# Patient Record
Sex: MALE | Race: BLACK OR AFRICAN AMERICAN | NOT HISPANIC OR LATINO | Employment: FULL TIME | URBAN - METROPOLITAN AREA
[De-identification: names, ages, dates, MRNs, and addresses within clinical notes are randomized per-mention and may not be internally consistent; named-entity substitution may affect disease eponyms.]

---

## 2020-10-31 ENCOUNTER — HOSPITAL ENCOUNTER (EMERGENCY)
Facility: HOSPITAL | Age: 40
Discharge: HOME | End: 2020-10-31
Attending: INTERNAL MEDICINE
Payer: COMMERCIAL

## 2020-10-31 VITALS
TEMPERATURE: 100 F | HEIGHT: 70 IN | DIASTOLIC BLOOD PRESSURE: 66 MMHG | BODY MASS INDEX: 25.77 KG/M2 | WEIGHT: 180 LBS | OXYGEN SATURATION: 96 % | RESPIRATION RATE: 18 BRPM | SYSTOLIC BLOOD PRESSURE: 117 MMHG | HEART RATE: 74 BPM

## 2020-10-31 DIAGNOSIS — R50.9 FEVER, UNSPECIFIED FEVER CAUSE: ICD-10-CM

## 2020-10-31 DIAGNOSIS — R05.9 COUGH: ICD-10-CM

## 2020-10-31 DIAGNOSIS — B34.9 VIRAL SYNDROME: ICD-10-CM

## 2020-10-31 DIAGNOSIS — J06.9 VIRAL URI WITH COUGH: ICD-10-CM

## 2020-10-31 DIAGNOSIS — J34.89 RHINORRHEA: Primary | ICD-10-CM

## 2020-10-31 LAB
CTP QC/QA: YES
INFLUENZA A ANTIGEN, POC: NEGATIVE
INFLUENZA B ANTIGEN, POC: NEGATIVE
SARS-COV-2 RDRP RESP QL NAA+PROBE: NEGATIVE

## 2020-10-31 PROCEDURE — 99284 EMERGENCY DEPT VISIT MOD MDM: CPT | Mod: 25,ER

## 2020-10-31 PROCEDURE — U0002 COVID-19 LAB TEST NON-CDC: HCPCS | Mod: ER | Performed by: PHYSICIAN ASSISTANT

## 2020-10-31 PROCEDURE — 25000003 PHARM REV CODE 250: Mod: ER | Performed by: EMERGENCY MEDICINE

## 2020-10-31 PROCEDURE — 87502 INFLUENZA DNA AMP PROBE: CPT | Mod: ER

## 2020-10-31 RX ORDER — ALBUTEROL SULFATE 90 UG/1
1-2 AEROSOL, METERED RESPIRATORY (INHALATION) EVERY 6 HOURS PRN
Qty: 8 G | Refills: 0 | Status: ON HOLD | OUTPATIENT
Start: 2020-10-31 | End: 2022-04-01 | Stop reason: HOSPADM

## 2020-10-31 RX ORDER — CODEINE PHOSPHATE AND GUAIFENESIN 10; 100 MG/5ML; MG/5ML
5 SOLUTION ORAL EVERY 8 HOURS PRN
Qty: 118 ML | Refills: 0 | Status: SHIPPED | OUTPATIENT
Start: 2020-10-31 | End: 2020-11-10

## 2020-10-31 RX ORDER — ACETAMINOPHEN 325 MG/1
650 TABLET ORAL
Status: COMPLETED | OUTPATIENT
Start: 2020-10-31 | End: 2020-10-31

## 2020-10-31 RX ORDER — BENZONATATE 100 MG/1
100 CAPSULE ORAL 3 TIMES DAILY PRN
Qty: 20 CAPSULE | Refills: 0 | Status: SHIPPED | OUTPATIENT
Start: 2020-10-31 | End: 2020-11-10

## 2020-10-31 RX ORDER — FLUTICASONE PROPIONATE 50 MCG
1 SPRAY, SUSPENSION (ML) NASAL 2 TIMES DAILY PRN
Qty: 15 G | Refills: 0 | Status: ON HOLD | OUTPATIENT
Start: 2020-10-31 | End: 2022-04-01 | Stop reason: HOSPADM

## 2020-10-31 RX ORDER — CETIRIZINE HYDROCHLORIDE 10 MG/1
10 TABLET ORAL DAILY
Qty: 30 TABLET | Refills: 0 | Status: ON HOLD | OUTPATIENT
Start: 2020-10-31 | End: 2022-04-01 | Stop reason: HOSPADM

## 2020-10-31 RX ADMIN — ACETAMINOPHEN 650 MG: 325 TABLET ORAL at 06:10

## 2020-10-31 NOTE — Clinical Note
"Lenard"Carlton Marrufo was seen and treated in our emergency department on 10/31/2020.     COVID-19 is present in our communities across the state. There is limited testing for COVID at this time, so not all patients can be tested. In this situation, your employee meets the following criteria:    Lenard Marrufo has met the criteria for COVID-19 testing and has a NEGATIVE result. The employee can return to work once they are asymptomatic for 72 hours without the use of fever reducing medications (Tylenol, Motrin, etc).     If you have any questions or concerns, or if I can be of further assistance, please do not hesitate to contact me.    Sincerely,             Claudio Reynaga PA-C"

## 2020-10-31 NOTE — ED PROVIDER NOTES
Encounter Date: 10/31/2020    SCRIBE #1 NOTE: I, Kathi Miranda, am scribing for, and in the presence of,  JAIME Dutta. I have scribed the following portions of the note - Other sections scribed: HPI, ROS, PE.       History     Chief Complaint   Patient presents with    COVID-19 Concerns     PRODUCTIVE COUGH, SOB, RUNNY NOSE, CONGESTION AND FEVER; DENIES N/V/D     Lenard Marrufo is a 40 y.o. male with a history of HIV who presents to the ED complaining of productive cough, SOB, rhinorrhea, congestion, fever, chills, and myalgias onset four days ago. States that it started with rhinorrhea four days ago, but started coughing three days ago. He took Tylenol, with some relief. Denies nausea, vomiting, or diarrhea. Patient does not take medication daily. Denies past surgical history. PCP is Dr. Agnes Glover.    The history is provided by the patient. No  was used.     Review of patient's allergies indicates:  No Known Allergies  Past Medical History:   Diagnosis Date    HIV (human immunodeficiency virus infection)      History reviewed. No pertinent surgical history.  No family history on file.  Social History     Tobacco Use    Smoking status: Current Every Day Smoker     Types: Cigars   Substance Use Topics    Alcohol use: Yes    Drug use: Never     Review of Systems   Constitutional: Positive for chills and fever.   HENT: Positive for congestion and rhinorrhea. Negative for sore throat.    Respiratory: Positive for cough (productive) and shortness of breath.    Cardiovascular: Negative for leg swelling.   Gastrointestinal: Negative for diarrhea, nausea and vomiting.   Musculoskeletal: Positive for myalgias.   Skin: Negative for rash.   Neurological: Negative for numbness.       Physical Exam     Initial Vitals [10/31/20 1808]   BP Pulse Resp Temp SpO2   133/85 97 18 (!) 101.3 °F (38.5 °C) 97 %      MAP       --         Physical Exam    Nursing note and vitals reviewed.  Constitutional:  He appears well-developed and well-nourished. No distress.   HENT:   Head: Normocephalic and atraumatic.   Nose: Rhinorrhea present.   Eyes: Conjunctivae and EOM are normal. Pupils are equal, round, and reactive to light.   Neck: Normal range of motion. Neck supple.   Cardiovascular: Normal rate, regular rhythm, normal heart sounds and intact distal pulses.   Pulmonary/Chest: Effort normal and breath sounds normal. No respiratory distress.   Abdominal: Soft. Normal appearance and bowel sounds are normal. There is no abdominal tenderness. There is no rigidity, no rebound, no guarding, no CVA tenderness and negative Major's sign.   Musculoskeletal: Normal range of motion.   Neurological: He is alert and oriented to person, place, and time.   Skin: Skin is warm, dry and intact. No rash noted.   Psychiatric: He has a normal mood and affect. His speech is normal and behavior is normal. Judgment and thought content normal. Cognition and memory are normal.         ED Course   Procedures  Labs Reviewed   SARS-COV-2 RDRP GENE   POCT RAPID INFLUENZA A/B          Imaging Results          X-Ray Chest AP Portable (Final result)  Result time 10/31/20 19:20:53    Final result by Seamus Reyes MD (10/31/20 19:20:53)                 Impression:      1. No acute cardiopulmonary process, hypoventilatory exam.      Electronically signed by: Seamus Reyes MD  Date:    10/31/2020  Time:    19:20             Narrative:    EXAMINATION:  XR CHEST AP PORTABLE    CLINICAL HISTORY:  Cough    TECHNIQUE:  Single frontal view of the chest was performed.    COMPARISON:  None    FINDINGS:  The cardiomediastinal silhouette is not enlarged.  There is no pleural effusion.  The trachea is midline.  The lungs are symmetrically expanded bilaterally without evidence of acute parenchymal process. No large focal consolidation seen.  There is no pneumothorax.  The osseous structures are unremarkable.                                 Medical Decision  Making:   History:   Old Medical Records: I decided to obtain old medical records.  Independently Interpreted Test(s):   I have ordered and independently interpreted X-rays - see prior notes.  Clinical Tests:   Lab Tests: Reviewed and Ordered  Radiological Study: Reviewed and Ordered  ED Management:  Hemodynamically stable.  Nontoxic and in no acute distress.  Patient is overall well-appearing, pleasant, conversational.  COVID and flu negative.  Chest x-ray without acute process.  Patient given Tylenol for fever.  History physical exam findings consistent with viral URI.  Will discharge home with prescriptions for symptomatic relief and PCP follow-up.  Strict ED return precautions given for any worsening or additional concerning symptoms.  Patient verbalizes understanding and is agreeable with plan.            Scribe Attestation:   Scribe #1: I performed the above scribed service and the documentation accurately describes the services I performed. I attest to the accuracy of the note.    Scribe attestation: I, Claudio Reynaga, personally performed the services described in this documentation.  All medical record entries made by the scribe were at my direction and in my presence.  I have reviewed the chart and agree that the record reflects my personal performance and is accurate and complete.                  Clinical Impression:     ICD-10-CM ICD-9-CM   1. Rhinorrhea  J34.89 478.19   2. Cough  R05 786.2   3. Fever, unspecified fever cause  R50.9 780.60   4. Viral syndrome  B34.9 079.99   5. Viral URI with cough  J06.9 465.9                      Disposition:   Disposition: Discharged  Condition: Stable     ED Disposition Condition    Discharge Stable        ED Prescriptions     Medication Sig Dispense Start Date End Date Auth. Provider    albuterol (PROVENTIL/VENTOLIN HFA) 90 mcg/actuation inhaler Inhale 1-2 puffs into the lungs every 6 (six) hours as needed. Rescue 8 g 10/31/2020 10/31/2021 Claudio Reynaga PA-C     fluticasone propionate (FLONASE) 50 mcg/actuation nasal spray 1 spray (50 mcg total) by Each Nostril route 2 (two) times daily as needed. 15 g 10/31/2020  Claudio Reynaga PA-C    cetirizine (ZYRTEC) 10 MG tablet Take 1 tablet (10 mg total) by mouth once daily. 30 tablet 10/31/2020 10/31/2021 Claudio Reynaga PA-C    guaifenesin-codeine 100-10 mg/5 ml (TUSSI-ORGANIDIN NR)  mg/5 mL syrup Take 5 mLs by mouth every 8 (eight) hours as needed for Cough. 118 mL 10/31/2020 11/10/2020 Claudio Reynaga PA-C    benzonatate (TESSALON) 100 MG capsule Take 1 capsule (100 mg total) by mouth 3 (three) times daily as needed. 20 capsule 10/31/2020 11/10/2020 Claudio Reynaga PA-C        Follow-up Information     Follow up With Specialties Details Why Contact Info    [facility]  Schedule an appointment as soon as possible for a visit   [address] [place] [address]  [phone]      [facility] Emergency Department Emergency Medicine Go to  If symptoms worsen [address] [facility] [id number] [phone]                                       Claudio Reynaga PA-C  11/04/20 0525

## 2020-11-01 NOTE — DISCHARGE INSTRUCTIONS
Please take new medication as directed and follow discharge instructions provided.  Please make sure to follow-up with your PCP to discuss today's emergency department visit and for further evaluation and management.  Please return emergency department immediately if your symptoms worsen or you develop any additional concerning symptoms.

## 2022-03-21 ENCOUNTER — HOSPITAL ENCOUNTER (EMERGENCY)
Facility: HOSPITAL | Age: 42
Discharge: HOME | End: 2022-03-21
Attending: EMERGENCY MEDICINE
Payer: COMMERCIAL

## 2022-03-21 VITALS
WEIGHT: 180 LBS | HEART RATE: 53 BPM | TEMPERATURE: 98 F | HEIGHT: 70 IN | BODY MASS INDEX: 25.77 KG/M2 | DIASTOLIC BLOOD PRESSURE: 62 MMHG | SYSTOLIC BLOOD PRESSURE: 117 MMHG | OXYGEN SATURATION: 97 % | RESPIRATION RATE: 20 BRPM

## 2022-03-21 DIAGNOSIS — R11.2 NAUSEA AND VOMITING, INTRACTABILITY OF VOMITING NOT SPECIFIED, UNSPECIFIED VOMITING TYPE: Primary | ICD-10-CM

## 2022-03-21 DIAGNOSIS — R10.13 EPIGASTRIC ABDOMINAL PAIN: ICD-10-CM

## 2022-03-21 LAB
ALBUMIN SERPL-MCNC: 3.5 G/DL (ref 3.3–5.5)
ALBUMIN SERPL-MCNC: 4.3 G/DL (ref 3.3–5.5)
ALLENS TEST: ABNORMAL
ALP SERPL-CCNC: 53 U/L (ref 42–141)
ALP SERPL-CCNC: 57 U/L (ref 42–141)
BILIRUB SERPL-MCNC: 0.5 MG/DL (ref 0.2–1.6)
BILIRUB SERPL-MCNC: 0.5 MG/DL (ref 0.2–1.6)
BILIRUBIN, POC UA: NEGATIVE
BLOOD, POC UA: NEGATIVE
BUN SERPL-MCNC: 13 MG/DL (ref 7–22)
CALCIUM SERPL-MCNC: 9.7 MG/DL (ref 8–10.3)
CHLORIDE SERPL-SCNC: 110 MMOL/L (ref 98–108)
CLARITY, POC UA: CLEAR
COLOR, POC UA: YELLOW
CREAT SERPL-MCNC: 1.1 MG/DL (ref 0.6–1.2)
GLUCOSE SERPL-MCNC: 117 MG/DL (ref 73–118)
GLUCOSE, POC UA: NEGATIVE
HCO3 UR-SCNC: 32.2 MMOL/L (ref 24–28)
KETONES, POC UA: NEGATIVE
LDH SERPL L TO P-CCNC: 1.58 MMOL/L (ref 0.5–2.2)
LEUKOCYTE EST, POC UA: NEGATIVE
NITRITE, POC UA: NEGATIVE
PCO2 BLDA: 49.9 MMHG (ref 35–45)
PH SMN: 7.42 [PH] (ref 7.35–7.45)
PH UR STRIP: 7 [PH]
PO2 BLDA: 29 MMHG (ref 40–60)
POC ALT (SGPT): 14 U/L (ref 10–47)
POC ALT (SGPT): 15 U/L (ref 10–47)
POC AMYLASE: 64 U/L (ref 14–97)
POC AST (SGOT): 27 U/L (ref 11–38)
POC AST (SGOT): 38 U/L (ref 11–38)
POC BE: 6 MMOL/L
POC GGT: 16 U/L (ref 5–65)
POC SATURATED O2: 55 % (ref 95–100)
POC TCO2: 28 MMOL/L (ref 18–33)
POC TCO2: 34 MMOL/L (ref 24–29)
POTASSIUM BLD-SCNC: 4.9 MMOL/L (ref 3.6–5.1)
PROTEIN, POC UA: NEGATIVE
PROTEIN, POC: 7.7 G/DL (ref 6.4–8.1)
PROTEIN, POC: 8.5 G/DL (ref 6.4–8.1)
SAMPLE: ABNORMAL
SITE: ABNORMAL
SODIUM BLD-SCNC: 143 MMOL/L (ref 128–145)
SPECIFIC GRAVITY, POC UA: 1.01
UROBILINOGEN, POC UA: 1 E.U./DL

## 2022-03-21 PROCEDURE — 85025 COMPLETE CBC W/AUTO DIFF WBC: CPT | Mod: ER

## 2022-03-21 PROCEDURE — 96375 TX/PRO/DX INJ NEW DRUG ADDON: CPT | Mod: ER

## 2022-03-21 PROCEDURE — 25500020 PHARM REV CODE 255: Mod: ER | Performed by: EMERGENCY MEDICINE

## 2022-03-21 PROCEDURE — 63600175 PHARM REV CODE 636 W HCPCS: Mod: ER | Performed by: EMERGENCY MEDICINE

## 2022-03-21 PROCEDURE — 82150 ASSAY OF AMYLASE: CPT | Mod: ER

## 2022-03-21 PROCEDURE — 96361 HYDRATE IV INFUSION ADD-ON: CPT | Mod: ER

## 2022-03-21 PROCEDURE — 96374 THER/PROPH/DIAG INJ IV PUSH: CPT | Mod: ER

## 2022-03-21 PROCEDURE — 81003 URINALYSIS AUTO W/O SCOPE: CPT | Mod: ER

## 2022-03-21 PROCEDURE — 25000003 PHARM REV CODE 250: Mod: ER | Performed by: EMERGENCY MEDICINE

## 2022-03-21 PROCEDURE — 80053 COMPREHEN METABOLIC PANEL: CPT | Mod: ER

## 2022-03-21 PROCEDURE — 96372 THER/PROPH/DIAG INJ SC/IM: CPT | Mod: 59 | Performed by: EMERGENCY MEDICINE

## 2022-03-21 PROCEDURE — 99285 EMERGENCY DEPT VISIT HI MDM: CPT | Mod: 25,ER

## 2022-03-21 RX ORDER — ONDANSETRON 2 MG/ML
8 INJECTION INTRAMUSCULAR; INTRAVENOUS
Status: COMPLETED | OUTPATIENT
Start: 2022-03-21 | End: 2022-03-21

## 2022-03-21 RX ORDER — KETOROLAC TROMETHAMINE 30 MG/ML
15 INJECTION, SOLUTION INTRAMUSCULAR; INTRAVENOUS
Status: COMPLETED | OUTPATIENT
Start: 2022-03-21 | End: 2022-03-21

## 2022-03-21 RX ORDER — ONDANSETRON 4 MG/1
4 TABLET, ORALLY DISINTEGRATING ORAL EVERY 8 HOURS PRN
Qty: 20 TABLET | Refills: 0 | Status: SHIPPED | OUTPATIENT
Start: 2022-03-21 | End: 2022-04-20 | Stop reason: ALTCHOICE

## 2022-03-21 RX ORDER — DICYCLOMINE HYDROCHLORIDE 20 MG/1
20 TABLET ORAL 2 TIMES DAILY
Qty: 20 TABLET | Refills: 0 | Status: SHIPPED | OUTPATIENT
Start: 2022-03-21 | End: 2022-04-20 | Stop reason: ALTCHOICE

## 2022-03-21 RX ORDER — KETOROLAC TROMETHAMINE 10 MG/1
10 TABLET, FILM COATED ORAL EVERY 6 HOURS
Qty: 20 TABLET | Refills: 0 | Status: SHIPPED | OUTPATIENT
Start: 2022-03-21 | End: 2022-03-26

## 2022-03-21 RX ORDER — DICYCLOMINE HYDROCHLORIDE 10 MG/ML
20 INJECTION INTRAMUSCULAR
Status: COMPLETED | OUTPATIENT
Start: 2022-03-21 | End: 2022-03-21

## 2022-03-21 RX ADMIN — IOHEXOL 100 ML: 350 INJECTION, SOLUTION INTRAVENOUS at 06:03

## 2022-03-21 RX ADMIN — KETOROLAC TROMETHAMINE 15 MG: 30 INJECTION, SOLUTION INTRAMUSCULAR; INTRAVENOUS at 06:03

## 2022-03-21 RX ADMIN — SODIUM CHLORIDE 1000 ML: 0.9 INJECTION, SOLUTION INTRAVENOUS at 04:03

## 2022-03-21 RX ADMIN — ONDANSETRON 8 MG: 2 INJECTION INTRAMUSCULAR; INTRAVENOUS at 04:03

## 2022-03-21 RX ADMIN — DICYCLOMINE HYDROCHLORIDE 20 MG: 20 INJECTION INTRAMUSCULAR at 04:03

## 2022-03-21 NOTE — ED PROVIDER NOTES
Encounter Date: 3/21/2022       History     Chief Complaint   Patient presents with    Vomiting     Pt c/o vomiting x 1hr     41 y.o. male with HIV on HAART last CD4 count 700's, VL undetectable, tobacco abuse presents emergency department complaining of acute nausea, vomiting and epigastric abdominal pain that began an hour prior to arrival.  He describes abdominal pain as a dull, bloating/fullness sensation.  He reports associated belching and increase flatus.  Reports last bowel movement today was soft, nonbloody, non melenic.  He reports taking Tums with minimal improvement.  Denies prior abdominal surgery.  Denies heavy NSAID use or EtOH use. Reports last meal around 11:00 p.m. last night.         The history is provided by the patient.     Review of patient's allergies indicates:  No Known Allergies  Past Medical History:   Diagnosis Date    HIV (human immunodeficiency virus infection)      History reviewed. No pertinent surgical history.  History reviewed. No pertinent family history.  Social History     Tobacco Use    Smoking status: Current Every Day Smoker     Types: Cigars   Substance Use Topics    Alcohol use: Yes    Drug use: Never     Review of Systems   Constitutional: Negative for appetite change and fever.   Respiratory: Negative for shortness of breath.    Cardiovascular: Negative for chest pain.   Gastrointestinal: Positive for abdominal pain, nausea and vomiting. Negative for constipation and diarrhea.   Genitourinary: Negative for decreased urine volume, difficulty urinating, frequency and hematuria.   All other systems reviewed and are negative.      Physical Exam     Initial Vitals [03/21/22 0411]   BP Pulse Resp Temp SpO2   (!) 173/74 61 20 98 °F (36.7 °C) 98 %      MAP       --         Physical Exam    Nursing note and vitals reviewed.  Constitutional: He appears well-developed and well-nourished. He is not diaphoretic. No distress.   HENT:   Head: Normocephalic and atraumatic.    Mouth/Throat: Oropharynx is clear and moist.   Eyes: Conjunctivae are normal.   Neck: Phonation normal. No stridor present.   Normal range of motion.  Cardiovascular: Regular rhythm and intact distal pulses.   Pulmonary/Chest: Effort normal. No accessory muscle usage or stridor. No tachypnea. No respiratory distress.   Abdominal: He exhibits no distension. There is abdominal tenderness in the epigastric area.   No right CVA tenderness.  No left CVA tenderness. There is no rebound, no guarding and no tenderness at McBurney's point.   Musculoskeletal:         General: No tenderness. Normal range of motion.      Cervical back: Normal range of motion.     Neurological: He is alert and oriented to person, place, and time. He has normal strength. Gait normal. GCS eye subscore is 4. GCS verbal subscore is 5. GCS motor subscore is 6.   Skin: Skin is warm and intact.   Psychiatric: He has a normal mood and affect.         ED Course   Procedures  Labs Reviewed   ISTAT PROCEDURE - Abnormal; Notable for the following components:       Result Value    POC PCO2 49.9 (*)     POC PO2 29 (*)     POC HCO3 32.2 (*)     POC SATURATED O2 55 (*)     POC TCO2 34 (*)     All other components within normal limits   POCT LIVER PANEL - Abnormal; Notable for the following components:    Protein, POC 8.5 (*)     All other components within normal limits   POCT CMP - Abnormal; Notable for the following components:    POC Chloride 110 (*)     All other components within normal limits   POCT CBC   POCT URINALYSIS W/O SCOPE   POCT URINALYSIS W/O SCOPE   POCT CMP   POCT LIVER PANEL          Imaging Results          US Abdomen Limited (Final result)  Result time 03/21/22 07:54:49    Final result by Jean Carlos Fontaine MD (03/21/22 07:54:49)                 Impression:      Gallbladder wall thickening and cholelithiasis without unusual vascularity or positive Major sign or definite acute cholecystitis.      Electronically signed by: Jean Carlos  MD Minal  Date:    03/21/2022  Time:    07:54             Narrative:    EXAMINATION:  US ABDOMEN LIMITED    CLINICAL HISTORY:  epigastric abdominal pain;    TECHNIQUE:  Limited ultrasound of the right upper quadrant of the abdomen (including pancreas, liver, gallbladder, common bile duct, and spleen) was performed.    COMPARISON:  CT scan abdomen pelvis 03/21/2022 558 hours    FINDINGS:  Liver: Normal in size, measuring 16.6 cm. Homogeneous echotexture. No focal hepatic lesions.    Gallbladder: Gallbladder wall 5.1 mm thickness, negative Major sign, cholelithiasis with largest stone 1.3 cm, 1.2 cm stone in gallbladder neck region, no unusual hypervascularity.    Biliary system: The common duct is not dilated, measuring 2.7 mm.  No intrahepatic ductal dilatation.    Spleen: Normal in size and echotexture, measuring 11.1 cm.    Miscellaneous: No upper abdominal ascites.                               CT Abdomen Pelvis With Contrast (Final result)  Result time 03/21/22 06:38:21    Final result by Seng Rodriguez MD (03/21/22 06:38:21)                 Impression:      Nonspecific gallbladder wall thickening and/or pericholecystic fluid.  No calcified gallstones.  Consider further evaluation with right upper quadrant ultrasound if there is clinical concern for cholecystitis.    Subtle rectal wall thickening with mild surrounding edema and trace pelvic free fluid.  Findings could be related to proctitis though consider follow-up endoscopy to further evaluate rectal wall thickening when clinically appropriate to exclude neoplasm.      Electronically signed by: Seng Rodriguez MD  Date:    03/21/2022  Time:    06:38             Narrative:    EXAMINATION:  CT ABDOMEN PELVIS WITH CONTRAST    CLINICAL HISTORY:  Epigastric pain;    TECHNIQUE:  Low dose axial images, sagittal and coronal reformations were obtained from the lung bases to the pubic symphysis following the IV administration of 100 mL of Omnipaque 350 .  Oral  contrast was not given.    COMPARISON:  None.    FINDINGS:  Lower Chest:    Lung bases are clear.  Heart size is normal.    Abdomen:    Liver is normal in size and contour.  No focal hepatic lesion.  No calcified gallstones identified.  There is gallbladder wall thickening and/or pericholecystic fluid.  No intrahepatic biliary ductal dilatation.    Spleen, adrenals, and pancreas are unremarkable.  No peripancreatic inflammatory changes or peripancreatic collection.    The kidneys are symmetric.  No hydronephrosis. No asymmetric perinephric inflammatory changes.    No small bowel obstruction.  Few colonic diverticula without CT findings of acute diverticulitis.  Appendix is normal.  There is rectal wall thickening with subtle perirectal edema.    No pneumoperitoneum or organized fluid collection.    No bulky lymphadenopathy.    Abdominal aorta is normal in caliber without significant atherosclerosis.    Portal, splenic, and superior mesenteric veins are patent.    Pelvis:    Urinary bladder is thin walled.  Prostate is unremarkable.  There is rectal wall thickening and subtle perirectal edema.  Trace pelvic free fluid.  No pelvic lymphadenopathy.    Bones and soft tissues:    No aggressive osseous lesions.  Extraperitoneal soft tissues are negative for acute finding.                                 Medications   sodium chloride 0.9% bolus 1,000 mL (0 mLs Intravenous Stopped 3/21/22 0638)   ondansetron injection 8 mg (8 mg Intravenous Given 3/21/22 0439)   iohexoL (OMNIPAQUE 350) injection 100 mL (100 mLs Intravenous Given 3/21/22 0615)   dicyclomine injection 20 mg (20 mg Intramuscular Given 3/21/22 0457)   ketorolac injection 15 mg (15 mg Intravenous Given 3/21/22 0651)     Medical Decision Making:   ED Management:  Care turned over to Dr. Alegre pending results of RUQ ultrasound and final disposition  Cele Rios MD, Emergency Medicine Staff 7:00 AM 3/21/2022               ED Course as of 03/21/22 1030    Mon Mar 21, 2022   0645 Pain improved 2/10, no emesis thus far [DL]      ED Course User Index  [DL] Cele Rios MD          Assumed care patient from Dr. Rios, pending ultrasound as noted above.  ED lab work reviewed, unremarkable, CT scan concerning for possible cholecystitis/gallbladder wall thickening, as well as rectal wall thickening.  Patient reassessed, abdomen benign.  Ultrasound reviewed showing cholelithiasis, patient is currently asymptomatic.  Did suspect acute cholecystitis/cholangitis/symptomatic cholecystitis.  Will give patient follow-up for Gastroenterology and General surgery, further treatment options discussed, will treat symptomatically at home. Discussed diagnosis and further treatment with patient, including f/u as needed.  Return precautions given and all questions answered.  Patient in understanding of plan.  Pt discharged to home improved and stable.           Labs Reviewed        Admission on 03/21/2022, Discharged on 03/21/2022   Component Date Value Ref Range Status    POC PH 03/21/2022 7.417  7.35 - 7.45 Final    POC PCO2 03/21/2022 49.9 (A) 35 - 45 mmHg Final    POC PO2 03/21/2022 29 (A) 40 - 60 mmHg Final    POC HCO3 03/21/2022 32.2 (A) 24 - 28 mmol/L Final    POC BE 03/21/2022 6  -2 to 2 mmol/L Final    POC SATURATED O2 03/21/2022 55 (A) 95 - 100 % Final    POC Lactate 03/21/2022 1.58  0.5 - 2.2 mmol/L Final    POC TCO2 03/21/2022 34 (A) 24 - 29 mmol/L Final    Sample 03/21/2022 VENOUS   Final    Site 03/21/2022 Other   Final    Allens Test 03/21/2022 N/A   Final    Albumin, POC 03/21/2022 4.3  3.3 - 5.5 g/dL Final    Alkaline Phosphatase, POC 03/21/2022 53  42 - 141 U/L Final    ALT (SGPT), POC 03/21/2022 14  10 - 47 U/L Final    Amylase, POC 03/21/2022 64  14 - 97 U/L Final    AST (SGOT), POC 03/21/2022 38  11 - 38 U/L Final    POC GGT 03/21/2022 16  5 - 65 U/L Final    Bilirubin, POC 03/21/2022 0.5  0.2 - 1.6 mg/dL Final    Protein, POC 03/21/2022 8.5 (A)  6.4 - 8.1 g/dL Final    Albumin, POC 03/21/2022 3.5  3.3 - 5.5 g/dL Final    Alkaline Phosphatase, POC 03/21/2022 57  42 - 141 U/L Final    ALT (SGPT), POC 03/21/2022 15  10 - 47 U/L Final    AST (SGOT), POC 03/21/2022 27  11 - 38 U/L Final    POC BUN 03/21/2022 13  7 - 22 mg/dL Final    Calcium, POC 03/21/2022 9.7  8.0 - 10.3 mg/dL Final    POC Chloride 03/21/2022 110 (A) 98 - 108 mmol/L Final    POC Creatinine 03/21/2022 1.1  0.6 - 1.2 mg/dL Final    POC Glucose 03/21/2022 117  73 - 118 mg/dL Final    POC Potassium 03/21/2022 4.9  3.6 - 5.1 mmol/L Final    POC Sodium 03/21/2022 143  128 - 145 mmol/L Final    Bilirubin, POC 03/21/2022 0.5  0.2 - 1.6 mg/dL Final    POC TCO2 03/21/2022 28  18 - 33 mmol/L Final    Protein, POC 03/21/2022 7.7  6.4 - 8.1 g/dL Final    Glucose, UA 03/21/2022 Negative   Final    Bilirubin, UA 03/21/2022 Negative   Final    Ketones, UA 03/21/2022 Negative   Final    Spec Grav UA 03/21/2022 1.015   Final    Blood, UA 03/21/2022 Negative   Final    PH, UA 03/21/2022 7.0   Final    Protein, UA 03/21/2022 Negative   Final    Urobilinogen, UA 03/21/2022 1.0  E.U./dL Final    Nitrite, UA 03/21/2022 Negative   Final    Leukocytes, UA 03/21/2022 Negative   Final    Color, UA 03/21/2022 Yellow   Final    Clarity, UA 03/21/2022 Clear   Final        Imaging Reviewed    Imaging Results          US Abdomen Limited (Final result)  Result time 03/21/22 07:54:49    Final result by Jean Carlos Fontaine MD (03/21/22 07:54:49)                 Impression:      Gallbladder wall thickening and cholelithiasis without unusual vascularity or positive Major sign or definite acute cholecystitis.      Electronically signed by: Jean Carlos Fontaine MD  Date:    03/21/2022  Time:    07:54             Narrative:    EXAMINATION:  US ABDOMEN LIMITED    CLINICAL HISTORY:  epigastric abdominal pain;    TECHNIQUE:  Limited ultrasound of the right upper quadrant of the abdomen (including pancreas,  liver, gallbladder, common bile duct, and spleen) was performed.    COMPARISON:  CT scan abdomen pelvis 03/21/2022 558 hours    FINDINGS:  Liver: Normal in size, measuring 16.6 cm. Homogeneous echotexture. No focal hepatic lesions.    Gallbladder: Gallbladder wall 5.1 mm thickness, negative Major sign, cholelithiasis with largest stone 1.3 cm, 1.2 cm stone in gallbladder neck region, no unusual hypervascularity.    Biliary system: The common duct is not dilated, measuring 2.7 mm.  No intrahepatic ductal dilatation.    Spleen: Normal in size and echotexture, measuring 11.1 cm.    Miscellaneous: No upper abdominal ascites.                               CT Abdomen Pelvis With Contrast (Final result)  Result time 03/21/22 06:38:21    Final result by Seng Rodriguez MD (03/21/22 06:38:21)                 Impression:      Nonspecific gallbladder wall thickening and/or pericholecystic fluid.  No calcified gallstones.  Consider further evaluation with right upper quadrant ultrasound if there is clinical concern for cholecystitis.    Subtle rectal wall thickening with mild surrounding edema and trace pelvic free fluid.  Findings could be related to proctitis though consider follow-up endoscopy to further evaluate rectal wall thickening when clinically appropriate to exclude neoplasm.      Electronically signed by: Seng Rodriguez MD  Date:    03/21/2022  Time:    06:38             Narrative:    EXAMINATION:  CT ABDOMEN PELVIS WITH CONTRAST    CLINICAL HISTORY:  Epigastric pain;    TECHNIQUE:  Low dose axial images, sagittal and coronal reformations were obtained from the lung bases to the pubic symphysis following the IV administration of 100 mL of Omnipaque 350 .  Oral contrast was not given.    COMPARISON:  None.    FINDINGS:  Lower Chest:    Lung bases are clear.  Heart size is normal.    Abdomen:    Liver is normal in size and contour.  No focal hepatic lesion.  No calcified gallstones identified.  There is  gallbladder wall thickening and/or pericholecystic fluid.  No intrahepatic biliary ductal dilatation.    Spleen, adrenals, and pancreas are unremarkable.  No peripancreatic inflammatory changes or peripancreatic collection.    The kidneys are symmetric.  No hydronephrosis. No asymmetric perinephric inflammatory changes.    No small bowel obstruction.  Few colonic diverticula without CT findings of acute diverticulitis.  Appendix is normal.  There is rectal wall thickening with subtle perirectal edema.    No pneumoperitoneum or organized fluid collection.    No bulky lymphadenopathy.    Abdominal aorta is normal in caliber without significant atherosclerosis.    Portal, splenic, and superior mesenteric veins are patent.    Pelvis:    Urinary bladder is thin walled.  Prostate is unremarkable.  There is rectal wall thickening and subtle perirectal edema.  Trace pelvic free fluid.  No pelvic lymphadenopathy.    Bones and soft tissues:    No aggressive osseous lesions.  Extraperitoneal soft tissues are negative for acute finding.                                Medications given in ED    Medications   sodium chloride 0.9% bolus 1,000 mL (0 mLs Intravenous Stopped 3/21/22 0638)   ondansetron injection 8 mg (8 mg Intravenous Given 3/21/22 0439)   iohexoL (OMNIPAQUE 350) injection 100 mL (100 mLs Intravenous Given 3/21/22 0615)   dicyclomine injection 20 mg (20 mg Intramuscular Given 3/21/22 0457)   ketorolac injection 15 mg (15 mg Intravenous Given 3/21/22 0651)       Note was created using voice recognition software. Note may have occasional typographical errors that may not have been identified and edited despite good cassy initial review prior to signing.    Clinical Impression:   Final diagnoses:  [R11.2] Nausea and vomiting, intractability of vomiting not specified, unspecified vomiting type (Primary)  [R10.13] Epigastric abdominal pain          ED Disposition Condition    Discharge Stable        ED Prescriptions      Medication Sig Dispense Start Date End Date Auth. Provider    ketorolac (TORADOL) 10 mg tablet Take 1 tablet (10 mg total) by mouth every 6 (six) hours. for 5 days 20 tablet 3/21/2022 3/26/2022 Jose Raul Alegre MD    dicyclomine (BENTYL) 20 mg tablet Take 1 tablet (20 mg total) by mouth 2 (two) times daily. 20 tablet 3/21/2022 4/20/2022 Jose Raul Alegre MD    ondansetron (ZOFRAN-ODT) 4 MG TbDL Take 1 tablet (4 mg total) by mouth every 8 (eight) hours as needed. 20 tablet 3/21/2022  Jose Raul Alegre MD        Follow-up Information     Follow up With Specialties Details Why Contact [facility] ED Emergency Medicine Go to  If symptoms worsen [address] [facility] [address] [phone]           Jose Raul Alegre MD  03/21/22 9170

## 2022-03-21 NOTE — Clinical Note
"Lenard Caraballorufus Marrufo was seen and treated in our emergency department on 3/21/2022.  He may return to work on 03/22/2022.       If you have any questions or concerns, please don't hesitate to call.      Jose Raul Alegre MD"

## 2022-03-22 ENCOUNTER — TELEPHONE (OUTPATIENT)
Dept: ENDOSCOPY | Facility: HOSPITAL | Age: 42
End: 2022-03-22
Payer: COMMERCIAL

## 2022-03-22 NOTE — TELEPHONE ENCOUNTER
----- Message from Lucy Cheatham sent at 3/22/2022  4:02 PM CDT -----  Contact: Patient  Type: Patient Call Back         Who called: Patient         What is the request in detail: returning missed call; please advise            Best call back number: [phone]         Additional Information:            Thank You

## 2022-03-22 NOTE — TELEPHONE ENCOUNTER
----- Message from Sandra Ramirez RN sent at 3/22/2022  2:36 PM CDT -----  Regarding: FW: Appt  Contact: [phone]  Referral to GI clinic for cyclic nausea and vomiting.    Thanks,  UNRULY Flores  ----- Message -----  From: Casi Gutierrez  Sent: 3/22/2022   1:48 PM CDT  To: [facility] Endo Schedulers  Subject: Appt                                             Patient is calling to schedule an appointment. Please contact patient

## 2022-03-25 ENCOUNTER — PATIENT MESSAGE (OUTPATIENT)
Dept: ENDOSCOPY | Facility: HOSPITAL | Age: 42
End: 2022-03-25
Payer: COMMERCIAL

## 2022-03-25 ENCOUNTER — OFFICE VISIT (OUTPATIENT)
Dept: SURGERY | Facility: CLINIC | Age: 42
End: 2022-03-25
Payer: COMMERCIAL

## 2022-03-25 VITALS
HEIGHT: 70 IN | BODY MASS INDEX: 24.9 KG/M2 | WEIGHT: 173.94 LBS | SYSTOLIC BLOOD PRESSURE: 131 MMHG | DIASTOLIC BLOOD PRESSURE: 79 MMHG | TEMPERATURE: 98 F | HEART RATE: 55 BPM

## 2022-03-25 DIAGNOSIS — R11.2 NAUSEA AND VOMITING, INTRACTABILITY OF VOMITING NOT SPECIFIED, UNSPECIFIED VOMITING TYPE: ICD-10-CM

## 2022-03-25 DIAGNOSIS — K80.20 CALCULUS OF GALLBLADDER WITHOUT CHOLECYSTITIS WITHOUT OBSTRUCTION: Primary | ICD-10-CM

## 2022-03-25 PROCEDURE — 3078F PR MOST RECENT DIASTOLIC BLOOD PRESSURE < 80 MM HG: ICD-10-PCS | Mod: CPTII,S$GLB,, | Performed by: STUDENT IN AN ORGANIZED HEALTH CARE EDUCATION/TRAINING PROGRAM

## 2022-03-25 PROCEDURE — 99999 PR PBB SHADOW E&M-EST. PATIENT-LVL V: ICD-10-PCS | Mod: PBBFAC,,, | Performed by: STUDENT IN AN ORGANIZED HEALTH CARE EDUCATION/TRAINING PROGRAM

## 2022-03-25 PROCEDURE — 3075F PR MOST RECENT SYSTOLIC BLOOD PRESS GE 130-139MM HG: ICD-10-PCS | Mod: CPTII,S$GLB,, | Performed by: STUDENT IN AN ORGANIZED HEALTH CARE EDUCATION/TRAINING PROGRAM

## 2022-03-25 PROCEDURE — 3078F DIAST BP <80 MM HG: CPT | Mod: CPTII,S$GLB,, | Performed by: STUDENT IN AN ORGANIZED HEALTH CARE EDUCATION/TRAINING PROGRAM

## 2022-03-25 PROCEDURE — 1159F PR MEDICATION LIST DOCUMENTED IN MEDICAL RECORD: ICD-10-PCS | Mod: CPTII,S$GLB,, | Performed by: STUDENT IN AN ORGANIZED HEALTH CARE EDUCATION/TRAINING PROGRAM

## 2022-03-25 PROCEDURE — 1159F MED LIST DOCD IN RCRD: CPT | Mod: CPTII,S$GLB,, | Performed by: STUDENT IN AN ORGANIZED HEALTH CARE EDUCATION/TRAINING PROGRAM

## 2022-03-25 PROCEDURE — 99204 OFFICE O/P NEW MOD 45 MIN: CPT | Mod: S$GLB,,, | Performed by: STUDENT IN AN ORGANIZED HEALTH CARE EDUCATION/TRAINING PROGRAM

## 2022-03-25 PROCEDURE — 3008F BODY MASS INDEX DOCD: CPT | Mod: CPTII,S$GLB,, | Performed by: STUDENT IN AN ORGANIZED HEALTH CARE EDUCATION/TRAINING PROGRAM

## 2022-03-25 PROCEDURE — 99999 PR PBB SHADOW E&M-EST. PATIENT-LVL V: CPT | Mod: PBBFAC,,, | Performed by: STUDENT IN AN ORGANIZED HEALTH CARE EDUCATION/TRAINING PROGRAM

## 2022-03-25 PROCEDURE — 99204 PR OFFICE/OUTPT VISIT, NEW, LEVL IV, 45-59 MIN: ICD-10-PCS | Mod: S$GLB,,, | Performed by: STUDENT IN AN ORGANIZED HEALTH CARE EDUCATION/TRAINING PROGRAM

## 2022-03-25 PROCEDURE — 3075F SYST BP GE 130 - 139MM HG: CPT | Mod: CPTII,S$GLB,, | Performed by: STUDENT IN AN ORGANIZED HEALTH CARE EDUCATION/TRAINING PROGRAM

## 2022-03-25 PROCEDURE — 3008F PR BODY MASS INDEX (BMI) DOCUMENTED: ICD-10-PCS | Mod: CPTII,S$GLB,, | Performed by: STUDENT IN AN ORGANIZED HEALTH CARE EDUCATION/TRAINING PROGRAM

## 2022-03-28 NOTE — PROGRESS NOTES
"General Surgery Clinic Note    Chief Complaint: Consult and Gall Bladder Problem      Subjective:  Mr. Marrufo is a 41 y.o. male who presents to clinic with symptoms consistent with biliary colic and findings of cholelithiasis on CT and U/S imaging.   Patient states recently he had severe abdominal pain and emesis following consuming pizza that woke him from his sleep and prompted him to come to the ED for evaluation and workup. Pain resolved, however he was noted to have cholelithiasis on imaging and referred to our clinic on an outpatient basis for workup of symptomatic cholelithiasis.     On examination, Mr. Marrufo is sitting comfortably in the room upon entering in no acute distress and in good spirits. He denies headaches, malaise, shortness of breath, chest pain, or nausea, vomiting, fever, chills, or nightsweats. He denies any abdominal pain, and the abdomen is soft, nontender, and nondistended on palpation.   ________________________________________________________________  Past Medical History:  No date: HIV (human immunodeficiency virus infection)  No past surgical history on file.  Social History     Socioeconomic History    Marital status:    Tobacco Use    Smoking status: Current Every Day Smoker     Types: Cigars   Substance and Sexual Activity    Alcohol use: Yes    Drug use: Never       Mr. Marrufo's family history is not on file.  He has No Known Allergies.    Mr. Marrufo has a current medication list which includes the following prescription(s): dicyclomine, ondansetron, albuterol, cetirizine, and fluticasone propionate.    ROS:  Pertinent items from 14 system review are noted in HPI, all others negative     Objective:  /79 (BP Location: Left arm, Patient Position: Sitting)   Pulse (!) 55   Temp 98.2 °F (36.8 °C)   Ht 5' 10" (1.778 m)   Wt 78.9 kg (173 lb 15.1 oz)   BMI 24.96 kg/m²     Physical Exam:  Gen: no acute distress, alert and oriented  HEENT: extraocular movements intact   CV: " regular rate and rhythm, bilateral radial pulses 2+    Pulm: no increased work of breathing, symmetrical chest rise  Abd: Soft, nontender, nondistended  Extr: moves all extremities well  Neuro: CN II-XII grossly intact w/o focal deficit  Integument: Normal turgor and tone. No jaundice.  Psych: appropriate affect, normal speech    Imaging:  I personally reviewed pertinent imaging and labs.    Assessment:   Mr. Marrufo is a 41 y.o. male who presents to clinic with symptoms consistent with biliary colic and findings of cholelithiasis on CT and U/S imaging.     We went over the imaging that was obtained with Mr. Marrufo which revealed cholelithiasis. While cholelithiasis itself is not an indication for cholecystectomy, Mr. Marrufo is experiencing symptoms consistent with biliary colic and symptomatic cholelithiasis which may be improved with laparoscopic cholecystectomy. We discussed the procedure including postoperative course. A common side effect after cholecystectomy is diarrhea, and Mr. Marrufo was informed to adhere to a low-fat diet in the postoperative period should he experience this side effect and that it resolves with time.   Patient would like to go forward with the procedure and informed consent was obtained in clinic.  We provided Mr. Marrufo with contact information to our clinic and he was informed to contact us without hesitation should he have any questions or concerns. All of his questions were answered before leaving clinic.      Plan:  -OR for lap cholecystectomy  -informed consent obtained in clinic    Tuan Bashir MD  Acute Care Surgery and Surgical Critical Care  [facility]

## 2022-03-31 ENCOUNTER — TELEPHONE (OUTPATIENT)
Dept: SURGERY | Facility: CLINIC | Age: 42
End: 2022-03-31
Payer: COMMERCIAL

## 2022-03-31 ENCOUNTER — ANESTHESIA EVENT (OUTPATIENT)
Dept: SURGERY | Facility: HOSPITAL | Age: 42
End: 2022-03-31
Payer: COMMERCIAL

## 2022-03-31 NOTE — PRE-PROCEDURE INSTRUCTIONS
Preop instructions(bathing/wear loose fitting clothing/fasting/directions/location of surgery/ and preop medication instructions reviewed with patient). Clear liquids are allowed up to 2 hours before procedure.Clear liquids are:water,apple juice, jello, gatorade & powerade. Patient instructed to hold/stop all blood thinning medications, prior to surgery, following the pre-surgery recommended guidelines. Instructed to follow the surgeon's instructions if they differ from these.    Patient verbalized understanding.    Denies any history of side effects or issues with anesthesia or sedation.    Patient advised of the updated visitor policy.  Patient aware of the need to have someone drive them home following same -day surgery.      Patient given arrival time of - 0500 to [facility]

## 2022-03-31 NOTE — ANESTHESIA PREPROCEDURE EVALUATION
[facility]  Anesthesia Pre-Operative Evaluation         Patient Name: Lenard Marrufo  YOB: 1980  MRN: [id number]    SUBJECTIVE:     Pre-operative evaluation for Procedure(s) (LRB):  CHOLECYSTECTOMY, LAPAROSCOPIC (N/A)     03/31/2022    Lenard Marrufo is a 41 y.o. male w/ a significant PMHx of HIV and tobacco abuse with symptoms consistent with biliary colic and findings of cholelithiasis    Patient now presents for the above procedure(s).      LDA: None documented.      Prev airway: None documented.    Drips: None documented.       There is no problem list on file for this patient.      Review of patient's allergies indicates:  No Known Allergies    Current Outpatient Medications:  No current facility-administered medications for this encounter.    Current Outpatient Medications:     albuterol (PROVENTIL/VENTOLIN HFA) 90 mcg/actuation inhaler, Inhale 1-2 puffs into the lungs every 6 (six) hours as needed. Rescue, Disp: 8 g, Rfl: 0    cetirizine (ZYRTEC) 10 MG tablet, Take 1 tablet (10 mg total) by mouth once daily., Disp: 30 tablet, Rfl: 0    dicyclomine (BENTYL) 20 mg tablet, Take 1 tablet (20 mg total) by mouth 2 (two) times daily., Disp: 20 tablet, Rfl: 0    fluticasone propionate (FLONASE) 50 mcg/actuation nasal spray, 1 spray (50 mcg total) by Each Nostril route 2 (two) times daily as needed., Disp: 15 g, Rfl: 0    ondansetron (ZOFRAN-ODT) 4 MG TbDL, Take 1 tablet (4 mg total) by mouth every 8 (eight) hours as needed., Disp: 20 tablet, Rfl: 0    No past surgical history on file.    Social History     Socioeconomic History    Marital status:    Tobacco Use    Smoking status: Current Every Day Smoker     Types: Cigars   Substance and Sexual Activity    Alcohol use: Yes    Drug use: Never       OBJECTIVE:     Vital Signs Range (Last 24H):         Significant Labs:  No results found for: WBC, HGB, HCT, PLT, CHOL, TRIG, HDL,  LDLDIRECT, ALT, AST, NA, K, CL, CREATININE, BUN, CO2, TSH, PSA, INR, GLUF, HGBA1C, MICROALBUR    Diagnostic Studies: No relevant studies.    EKG:   No results found for this or any previous visit.    2D ECHO:  TTE:  No results found for this or any previous visit.    ALEKSANDER:  No results found for this or any previous visit.    ASSESSMENT/PLAN:                                                                                              03/31/2022  Lenard Marrufo is a 41 y.o., male.      Pre-op Assessment    I have reviewed the Patient Summary Reports.     I have reviewed the Nursing Notes.       Review of Systems  Anesthesia Hx:  No problems with previous Anesthesia    Hematology/Oncology:  Hematology Normal   Oncology Normal     EENT/Dental:EENT/Dental Normal   Cardiovascular:  Cardiovascular Normal     Pulmonary:  Pulmonary Normal    Renal/:  Renal/ Normal     Hepatic/GI:  Hepatic/GI Normal    Musculoskeletal:  Musculoskeletal Normal    Neurological:  Neurology Normal    Endocrine:  Endocrine Normal    Dermatological:  Skin Normal    Psych:  Psychiatric Normal           Physical Exam  General: Well nourished, Cooperative, Alert and Oriented    Airway:  Mallampati: III / II  Mouth Opening: Normal  TM Distance: Normal  Tongue: Normal  Neck ROM: Normal ROM    Chest/Lungs:  Normal Respiratory Rate    Heart:  Rate: Normal    Abdomen:  Normal        Anesthesia Plan  Type of Anesthesia, risks & benefits discussed:    Anesthesia Type: Gen ETT  Intra-op Monitoring Plan: Standard ASA Monitors  Post Op Pain Control Plan: multimodal analgesia  Airway Plan: Direct, Awake  Informed Consent: Informed consent signed with the Patient and all parties understand the risks and agree with anesthesia plan.  All questions answered. Patient consented to blood products? Yes  ASA Score: 2  Day of Surgery Review of History & Physical: H&P Update referred to the surgeon/provider.    Ready For Surgery From Anesthesia Perspective.      .

## 2022-04-01 ENCOUNTER — ANESTHESIA (OUTPATIENT)
Dept: SURGERY | Facility: HOSPITAL | Age: 42
End: 2022-04-01
Payer: COMMERCIAL

## 2022-04-01 ENCOUNTER — HOSPITAL ENCOUNTER (OUTPATIENT)
Facility: HOSPITAL | Age: 42
Discharge: HOME | End: 2022-04-01
Attending: STUDENT IN AN ORGANIZED HEALTH CARE EDUCATION/TRAINING PROGRAM | Admitting: STUDENT IN AN ORGANIZED HEALTH CARE EDUCATION/TRAINING PROGRAM
Payer: COMMERCIAL

## 2022-04-01 VITALS
SYSTOLIC BLOOD PRESSURE: 130 MMHG | OXYGEN SATURATION: 96 % | BODY MASS INDEX: 24.34 KG/M2 | HEIGHT: 70 IN | WEIGHT: 170 LBS | HEART RATE: 74 BPM | DIASTOLIC BLOOD PRESSURE: 82 MMHG | RESPIRATION RATE: 17 BRPM | TEMPERATURE: 98 F

## 2022-04-01 DIAGNOSIS — K80.20 CALCULUS OF GALLBLADDER WITHOUT CHOLECYSTITIS WITHOUT OBSTRUCTION: Primary | ICD-10-CM

## 2022-04-01 PROCEDURE — 36000708 HC OR TIME LEV III 1ST 15 MIN: Performed by: STUDENT IN AN ORGANIZED HEALTH CARE EDUCATION/TRAINING PROGRAM

## 2022-04-01 PROCEDURE — 25000003 PHARM REV CODE 250: Performed by: STUDENT IN AN ORGANIZED HEALTH CARE EDUCATION/TRAINING PROGRAM

## 2022-04-01 PROCEDURE — 88304 TISSUE EXAM BY PATHOLOGIST: CPT | Performed by: STUDENT IN AN ORGANIZED HEALTH CARE EDUCATION/TRAINING PROGRAM

## 2022-04-01 PROCEDURE — 37000008 HC ANESTHESIA 1ST 15 MINUTES: Performed by: STUDENT IN AN ORGANIZED HEALTH CARE EDUCATION/TRAINING PROGRAM

## 2022-04-01 PROCEDURE — 63600175 PHARM REV CODE 636 W HCPCS: Performed by: STUDENT IN AN ORGANIZED HEALTH CARE EDUCATION/TRAINING PROGRAM

## 2022-04-01 PROCEDURE — 27201423 OPTIME MED/SURG SUP & DEVICES STERILE SUPPLY: Performed by: STUDENT IN AN ORGANIZED HEALTH CARE EDUCATION/TRAINING PROGRAM

## 2022-04-01 PROCEDURE — 47562 PR LAP,CHOLECYSTECTOMY: ICD-10-PCS | Mod: ,,, | Performed by: STUDENT IN AN ORGANIZED HEALTH CARE EDUCATION/TRAINING PROGRAM

## 2022-04-01 PROCEDURE — 71000015 HC POSTOP RECOV 1ST HR: Performed by: STUDENT IN AN ORGANIZED HEALTH CARE EDUCATION/TRAINING PROGRAM

## 2022-04-01 PROCEDURE — 36000709 HC OR TIME LEV III EA ADD 15 MIN: Performed by: STUDENT IN AN ORGANIZED HEALTH CARE EDUCATION/TRAINING PROGRAM

## 2022-04-01 PROCEDURE — 71000016 HC POSTOP RECOV ADDL HR: Performed by: STUDENT IN AN ORGANIZED HEALTH CARE EDUCATION/TRAINING PROGRAM

## 2022-04-01 PROCEDURE — 71000044 HC DOSC ROUTINE RECOVERY FIRST HOUR: Performed by: STUDENT IN AN ORGANIZED HEALTH CARE EDUCATION/TRAINING PROGRAM

## 2022-04-01 PROCEDURE — 88304 PR  SURG PATH,LEVEL III: ICD-10-PCS | Mod: 26,,, | Performed by: STUDENT IN AN ORGANIZED HEALTH CARE EDUCATION/TRAINING PROGRAM

## 2022-04-01 PROCEDURE — D9220A PRA ANESTHESIA: Mod: ,,, | Performed by: ANESTHESIOLOGY

## 2022-04-01 PROCEDURE — 88304 TISSUE EXAM BY PATHOLOGIST: CPT | Mod: 26,,, | Performed by: STUDENT IN AN ORGANIZED HEALTH CARE EDUCATION/TRAINING PROGRAM

## 2022-04-01 PROCEDURE — 37000009 HC ANESTHESIA EA ADD 15 MINS: Performed by: STUDENT IN AN ORGANIZED HEALTH CARE EDUCATION/TRAINING PROGRAM

## 2022-04-01 PROCEDURE — 47562 LAPAROSCOPIC CHOLECYSTECTOMY: CPT | Mod: ,,, | Performed by: STUDENT IN AN ORGANIZED HEALTH CARE EDUCATION/TRAINING PROGRAM

## 2022-04-01 PROCEDURE — D9220A PRA ANESTHESIA: ICD-10-PCS | Mod: ,,, | Performed by: ANESTHESIOLOGY

## 2022-04-01 RX ORDER — ACETAMINOPHEN 500 MG
1000 TABLET ORAL
Status: COMPLETED | OUTPATIENT
Start: 2022-04-01 | End: 2022-04-01

## 2022-04-01 RX ORDER — SODIUM CHLORIDE 9 MG/ML
INJECTION, SOLUTION INTRAVENOUS CONTINUOUS
Status: DISCONTINUED | OUTPATIENT
Start: 2022-04-01 | End: 2022-04-01 | Stop reason: HOSPADM

## 2022-04-01 RX ORDER — ROCURONIUM BROMIDE 10 MG/ML
INJECTION, SOLUTION INTRAVENOUS
Status: DISCONTINUED | OUTPATIENT
Start: 2022-04-01 | End: 2022-04-01

## 2022-04-01 RX ORDER — ONDANSETRON 2 MG/ML
INJECTION INTRAMUSCULAR; INTRAVENOUS
Status: DISCONTINUED | OUTPATIENT
Start: 2022-04-01 | End: 2022-04-01

## 2022-04-01 RX ORDER — SODIUM CHLORIDE 0.9 % (FLUSH) 0.9 %
10 SYRINGE (ML) INJECTION
Status: DISCONTINUED | OUTPATIENT
Start: 2022-04-01 | End: 2022-04-01 | Stop reason: HOSPADM

## 2022-04-01 RX ORDER — MIDAZOLAM HYDROCHLORIDE 1 MG/ML
INJECTION INTRAMUSCULAR; INTRAVENOUS
Status: DISCONTINUED | OUTPATIENT
Start: 2022-04-01 | End: 2022-04-01

## 2022-04-01 RX ORDER — OXYCODONE HYDROCHLORIDE 5 MG/1
5 TABLET ORAL EVERY 6 HOURS PRN
Qty: 20 TABLET | Refills: 0 | Status: SHIPPED | OUTPATIENT
Start: 2022-04-01 | End: 2022-04-20 | Stop reason: ALTCHOICE

## 2022-04-01 RX ORDER — FENTANYL CITRATE 50 UG/ML
INJECTION, SOLUTION INTRAMUSCULAR; INTRAVENOUS
Status: DISCONTINUED | OUTPATIENT
Start: 2022-04-01 | End: 2022-04-01

## 2022-04-01 RX ORDER — DEXAMETHASONE SODIUM PHOSPHATE 4 MG/ML
INJECTION, SOLUTION INTRA-ARTICULAR; INTRALESIONAL; INTRAMUSCULAR; INTRAVENOUS; SOFT TISSUE
Status: DISCONTINUED | OUTPATIENT
Start: 2022-04-01 | End: 2022-04-01

## 2022-04-01 RX ORDER — LIDOCAINE HYDROCHLORIDE 20 MG/ML
INJECTION, SOLUTION EPIDURAL; INFILTRATION; INTRACAUDAL; PERINEURAL
Status: DISCONTINUED | OUTPATIENT
Start: 2022-04-01 | End: 2022-04-01

## 2022-04-01 RX ORDER — KETAMINE HCL IN 0.9 % NACL 50 MG/5 ML
SYRINGE (ML) INTRAVENOUS
Status: DISCONTINUED | OUTPATIENT
Start: 2022-04-01 | End: 2022-04-01

## 2022-04-01 RX ORDER — BUPIVACAINE HYDROCHLORIDE 5 MG/ML
INJECTION, SOLUTION EPIDURAL; INTRACAUDAL
Status: DISCONTINUED | OUTPATIENT
Start: 2022-04-01 | End: 2022-04-01 | Stop reason: HOSPADM

## 2022-04-01 RX ORDER — CEFAZOLIN SODIUM/WATER 2 G/20 ML
2 SYRINGE (ML) INTRAVENOUS
Status: DISCONTINUED | OUTPATIENT
Start: 2022-04-01 | End: 2022-04-01 | Stop reason: HOSPADM

## 2022-04-01 RX ORDER — HYDROMORPHONE HYDROCHLORIDE 1 MG/ML
0.2 INJECTION, SOLUTION INTRAMUSCULAR; INTRAVENOUS; SUBCUTANEOUS EVERY 5 MIN PRN
Status: DISCONTINUED | OUTPATIENT
Start: 2022-04-01 | End: 2022-04-01 | Stop reason: HOSPADM

## 2022-04-01 RX ORDER — NEOSTIGMINE METHYLSULFATE 0.5 MG/ML
INJECTION, SOLUTION INTRAVENOUS
Status: DISCONTINUED | OUTPATIENT
Start: 2022-04-01 | End: 2022-04-01

## 2022-04-01 RX ORDER — ONDANSETRON 2 MG/ML
4 INJECTION INTRAMUSCULAR; INTRAVENOUS DAILY PRN
Status: DISCONTINUED | OUTPATIENT
Start: 2022-04-01 | End: 2022-04-01 | Stop reason: HOSPADM

## 2022-04-01 RX ORDER — OXYCODONE HYDROCHLORIDE 5 MG/1
5 TABLET ORAL ONCE
Status: COMPLETED | OUTPATIENT
Start: 2022-04-01 | End: 2022-04-01

## 2022-04-01 RX ORDER — PROPOFOL 10 MG/ML
VIAL (ML) INTRAVENOUS
Status: DISCONTINUED | OUTPATIENT
Start: 2022-04-01 | End: 2022-04-01

## 2022-04-01 RX ORDER — OXYCODONE HYDROCHLORIDE 5 MG/1
TABLET ORAL
Status: DISCONTINUED
Start: 2022-04-01 | End: 2022-04-01 | Stop reason: HOSPADM

## 2022-04-01 RX ORDER — CEFAZOLIN SODIUM 1 G/3ML
INJECTION, POWDER, FOR SOLUTION INTRAMUSCULAR; INTRAVENOUS
Status: DISCONTINUED | OUTPATIENT
Start: 2022-04-01 | End: 2022-04-01

## 2022-04-01 RX ADMIN — Medication 10 MG: at 07:04

## 2022-04-01 RX ADMIN — LIDOCAINE HYDROCHLORIDE 80 MG: 20 INJECTION, SOLUTION EPIDURAL; INFILTRATION; INTRACAUDAL; PERINEURAL at 07:04

## 2022-04-01 RX ADMIN — ROCURONIUM BROMIDE 20 MG: 10 INJECTION, SOLUTION INTRAVENOUS at 07:04

## 2022-04-01 RX ADMIN — SUGAMMADEX 200 MG: 100 INJECTION, SOLUTION INTRAVENOUS at 08:04

## 2022-04-01 RX ADMIN — DEXAMETHASONE SODIUM PHOSPHATE 4 MG: 4 INJECTION, SOLUTION INTRAMUSCULAR; INTRAVENOUS at 07:04

## 2022-04-01 RX ADMIN — ONDANSETRON HYDROCHLORIDE 4 MG: 2 INJECTION INTRAMUSCULAR; INTRAVENOUS at 08:04

## 2022-04-01 RX ADMIN — GLYCOPYRROLATE 0.6 MG: 0.2 INJECTION, SOLUTION INTRAMUSCULAR; INTRAVITREAL at 08:04

## 2022-04-01 RX ADMIN — MIDAZOLAM HYDROCHLORIDE 2 MG: 1 INJECTION, SOLUTION INTRAMUSCULAR; INTRAVENOUS at 06:04

## 2022-04-01 RX ADMIN — GLYCOPYRROLATE 0.2 MG: 0.2 INJECTION, SOLUTION INTRAMUSCULAR; INTRAVITREAL at 07:04

## 2022-04-01 RX ADMIN — HYDROMORPHONE HYDROCHLORIDE 0.2 MG: 1 INJECTION, SOLUTION INTRAMUSCULAR; INTRAVENOUS; SUBCUTANEOUS at 09:04

## 2022-04-01 RX ADMIN — FENTANYL CITRATE 100 MCG: 50 INJECTION, SOLUTION INTRAMUSCULAR; INTRAVENOUS at 07:04

## 2022-04-01 RX ADMIN — PROPOFOL 200 MG: 10 INJECTION, EMULSION INTRAVENOUS at 07:04

## 2022-04-01 RX ADMIN — NEOSTIGMINE METHYLSULFATE 5 MG: 0.5 INJECTION INTRAVENOUS at 08:04

## 2022-04-01 RX ADMIN — OXYCODONE 5 MG: 5 TABLET ORAL at 09:04

## 2022-04-01 RX ADMIN — SODIUM CHLORIDE: 0.9 INJECTION, SOLUTION INTRAVENOUS at 05:04

## 2022-04-01 RX ADMIN — ROCURONIUM BROMIDE 50 MG: 10 INJECTION, SOLUTION INTRAVENOUS at 07:04

## 2022-04-01 RX ADMIN — ACETAMINOPHEN 1000 MG: 500 TABLET ORAL at 05:04

## 2022-04-01 RX ADMIN — SODIUM CHLORIDE: 9 INJECTION, SOLUTION INTRAVENOUS at 06:04

## 2022-04-01 RX ADMIN — CEFAZOLIN 2 G: 330 INJECTION, POWDER, FOR SOLUTION INTRAMUSCULAR; INTRAVENOUS at 07:04

## 2022-04-01 RX ADMIN — Medication 20 MG: at 07:04

## 2022-04-01 NOTE — OP NOTE
[facility]ya[facility] - Surgery ([facility])  General Surgery  Operative Note    DATE: 4/1/2022     STAFF SURGEON: Surgeon(s) and Role:     * Tuan Bashir MD - Primary     * Gudelia Paiz MD - Resident - Assisting     * Sandra Brumfield MD - Resident - Assisting    HOUSE STAFF SURGEON: Sandra Brumfield MD (RES)    PRE-OP DIAGNOSIS: Nausea and vomiting, intractability of vomiting not specified, unspecified vomiting type [R11.2]  Calculus of gallbladder without cholecystitis without obstruction [K80.20]    POST-OP DIAGNOSIS: Nausea and vomiting, intractability of vomiting not specified, unspecified vomiting type [R11.2]  Calculus of gallbladder without cholecystitis without obstruction [K80.20]    PROCEDURE: Procedure(s) (LRB):  CHOLECYSTECTOMY, LAPAROSCOPIC (N/A)    ANESTHESIA: Choice    FINDINGS: Gallbladder with minimal inflammation. Critical view obtained.     INDICATIONS: Lenard Marrufo is a 41 y.o. male with a history of biliary colic.     PROCEDURE IN DETAIL:  The patient was identified in Preoperative Holding and informed consent was verified. They were then brought back to the operating room and placed in the supine position with the arms out. Monitors were applied and there was smooth induction of general endotracheal anesthesia. The patient's abdomen was prepped and draped in the standard sterile surgical fashion. A time-out was performed and all team members present agreed this was the correct procedure on the correct patient. We also confirmed administration of appropriate preoperative antibiotics.     The supraumbilical skin and subcutaneous tissues injection with 0.25% Marcaine. A 10 mm incision was then made and blunt dissection was carried down to the fascia. The abdomen was opened sharply and then a 0-vicryl suture was placed through the fascia in a figure of eight fashion. A 10 mm trocar was placed through the defect and the abdomen was insufflated to 15 mm Hg. Then using a 10-mm scope, the abdomen was  entered and examined. There was no evidence of injury from the initial trocar placement. Three additional 5-mm trocars were placed under direct vision through separate stab incisions, in the subxiphoid space and the right upper quadrant.     We the directed our attention to the right upper quadrant. The gallbladder was identified and noted to have minimal inflammatory changes. The fundus was grasped and retracted cranially and the infundibulum was grasped and retracted laterally. With a combination of cautery and blunt dissection, we were able to get the the peritoneal reflection off the infundibulum and neck of the gallbladder and identify both the cystic duct and artery. Both structures were cleared of fatty and inflammatory tissue and then the bottom 1/3 of the cystic plate was cleared obtaining the critical view of safety. Both duct and artery were then triply clipped (two proximal, one distal) and sharply divided. The gallbladder was dissected off the gallbladder fossa using Bovie electrocautery from infundibulum to fundus until free. It was placed into an EndoCatch bag and removed from the umbilical port site with no difficulty. We then reexamined the right upper quadrant. The gallbladder fossa was examined and no further bleeding or any bile leak were noted. The clips on the cystic duct and artery were examined and no bleeding or bile leak were noted. The right upper quadrant was irrigated with saline briefly until the returning effluent was clear. All ports were removed under direct vision and no bleeding from any port site was noted. The insufflation of the abdomen was then evacuated and the laparoscope was removed. The umbilical site was closed with the previously placed 0-vicryl suture. All port sites were infiltrated with Marcaine and closed in a subcuticular fashion with 4-0 monocryl. Sterile dressings were applied. The patient was extubated in the Operating Room and transported to the Recovery Room in  stable condition. All sponge, instrument and needle counts were correct at the end of the case. I was present and scrubbed for the entire procedure    IMPLANTS/DRAINS:   * No implants in log *    SPECIMENS:   1. Gallbladder     EBL: Minimal          Sandra Brumfield MD  General Surgery, PGY-3  [phone]

## 2022-04-01 NOTE — TRANSFER OF CARE
"Anesthesia Transfer of Care Note    Patient: Lenard Marrufo    Procedure(s) Performed: Procedure(s) (LRB):  CHOLECYSTECTOMY, LAPAROSCOPIC (N/A)    Patient location: PACU    Anesthesia Type: general    Transport from OR: Transported from OR on 6-10 L/min O2 by face mask with adequate spontaneous ventilation    Post pain: adequate analgesia    Post assessment: no apparent anesthetic complications    Post vital signs: stable    Level of consciousness: awake and alert    Nausea/Vomiting: no nausea/vomiting    Complications: none    Transfer of care protocol was followed      Last vitals:   Visit Vitals  /83 (BP Location: Left arm, Patient Position: Lying)   Pulse (!) 52   Temp 36.6 °C (97.9 °F) (Temporal)   Resp 17   Ht 5' 10" (1.778 m)   Wt 77.1 kg (170 lb)   SpO2 100%   BMI 24.39 kg/m²     "

## 2022-04-01 NOTE — ANESTHESIA POSTPROCEDURE EVALUATION
Anesthesia Post Evaluation    Patient: Lenard Marrufo    Procedure(s) Performed: Procedure(s) (LRB):  CHOLECYSTECTOMY, LAPAROSCOPIC (N/A)    Final Anesthesia Type: general      Patient location during evaluation: PACU  Patient participation: Yes- Able to Participate  Level of consciousness: awake and alert  Post-procedure vital signs: reviewed and stable  Pain management: adequate  Airway patency: patent    PONV status at discharge: No PONV  Anesthetic complications: no      Cardiovascular status: blood pressure returned to baseline  Respiratory status: unassisted  Hydration status: euvolemic  Follow-up not needed.          Vitals Value Taken Time   /82 04/01/22 1031   Temp 36.5 °C (97.7 °F) 04/01/22 0839   Pulse 80 04/01/22 1039   Resp 17 04/01/22 1030   SpO2 97 % 04/01/22 1039   Vitals shown include unvalidated device data.      No case tracking events are documented in the log.      Pain/Nicole Score: Pain Rating Prior to Med Admin: 6 (4/1/2022  9:42 AM)  Pain Rating Post Med Admin: 3 (4/1/2022 10:30 AM)  Nicole Score: 10 (4/1/2022 10:30 AM)

## 2022-04-01 NOTE — ANESTHESIA PROCEDURE NOTES
Intubation    Date/Time: 4/1/2022 7:05 AM  Performed by: Iker Ruiz MD  Authorized by: Seamus Dupont MD     Intubation:     Induction:  Intravenous    Intubated:  Postinduction    Mask Ventilation:  Easy mask    Attempts:  1    Attempted By:  Resident anesthesiologist    Method of Intubation:  Direct    Blade:  Johnson 2    Laryngeal View Grade: Grade I - full view of cords      Difficult Airway Encountered?: No      Complications:  None    Airway Device:  Oral endotracheal tube    Airway Device Size:  7.5    Style/Cuff Inflation:  Cuffed (inflated to minimal occlusive pressure)    Tube secured:  23    Secured at:  The lips    Placement Verified By:  Capnometry and Revisualization with laryngoscopy    Complicating Factors:  None and large/floppy epiglottis    Findings Post-Intubation:  BS equal bilateral and atraumatic/condition of teeth unchanged

## 2022-04-01 NOTE — BRIEF OP NOTE
[facility] - Surgery ([facility])  Brief Operative Note    Surgery Date: 4/1/2022     Surgeon(s) and Role:     * Tuan Bashir MD - Primary     * Gudelia Paiz MD - Resident - Assisting     * Sandra Brumfield MD - Resident - Assisting      Pre-op Diagnosis:  Nausea and vomiting, intractability of vomiting not specified, unspecified vomiting type [R11.2]  Calculus of gallbladder without cholecystitis without obstruction [K80.20]    Post-op Diagnosis:  Post-Op Diagnosis Codes:     * Nausea and vomiting, intractability of vomiting not specified, unspecified vomiting type [R11.2]     * Calculus of gallbladder without cholecystitis without obstruction [K80.20]    Procedure(s) (LRB):  CHOLECYSTECTOMY, LAPAROSCOPIC (N/A)    Anesthesia: Choice    Operative Findings: Lap lui completed without apparent complications. Critical view obtained prior to clipping duct and artery.    Estimated Blood Loss: Minimal         Specimens:   Specimen (24h ago, onward)             Start     Ordered    04/01/22 0806  Specimen to Pathology, Surgery General Surgery  Once        Comments: Pre-op Diagnosis: Nausea and vomiting, intractability of vomiting not specified, unspecified vomiting type [R11.2]  Calculus of gallbladder without cholecystitis without obstruction [K80.20]    Procedure(s):  CHOLECYSTECTOMY, LAPAROSCOPIC     Number of specimens: 1    Name of specimens:   1. GALL BLADDER -PERMANENT   References:    Click here for ordering Quick Tip   Question Answer Comment   Procedure Type: General Surgery    Specimen Class: Routine/Screening    Release to patient Immediate        04/01/22 0813                  Discharge Note    OUTCOME: Patient tolerated treatment/procedure well without complication and is now ready for discharge.    DISPOSITION: Home or Self Care    FINAL DIAGNOSIS:  Calculus of gallbladder without cholecystitis without obstruction    FOLLOWUP: In clinic    DISCHARGE INSTRUCTIONS:    Discharge Procedure Orders   Lifting  restrictions   Order Comments: Do not lift anything >10 lbs (about a gallon of milk) for 6 weeks     No driving until:   Order Comments: No longer taking narcotic pain meds     Notify your health care provider if you experience any of the following:  temperature >100.4     Notify your health care provider if you experience any of the following:  persistent nausea and vomiting or diarrhea     Notify your health care provider if you experience any of the following:  severe uncontrolled pain     Notify your health care provider if you experience any of the following:  redness, tenderness, or signs of infection (pain, swelling, redness, odor or green/yellow discharge around incision site)     Notify your health care provider if you experience any of the following:  difficulty breathing or increased cough     Notify your health care provider if you experience any of the following:  severe persistent headache     Notify your health care provider if you experience any of the following:  worsening rash     Notify your health care provider if you experience any of the following:  persistent dizziness, light-headedness, or visual disturbances     Notify your health care provider if you experience any of the following:  increased confusion or weakness     No dressing needed   Order Comments: Okay to shower tomorrow. Please do not soak in water such as a bath, hot tub, or pool for 2 weeks  Your incision is covered with surgical glue (dermabond). When showering, allow soapy water to run over the incision and then pat dry. Do not scrub or pick at the glue. It will fall off on its own over the next 1-2 weeks.

## 2022-04-07 LAB
FINAL PATHOLOGIC DIAGNOSIS: NORMAL
Lab: NORMAL

## 2022-04-14 ENCOUNTER — OFFICE VISIT (OUTPATIENT)
Dept: SURGERY | Facility: CLINIC | Age: 42
End: 2022-04-14
Payer: COMMERCIAL

## 2022-04-14 VITALS
WEIGHT: 169.75 LBS | HEIGHT: 70 IN | SYSTOLIC BLOOD PRESSURE: 108 MMHG | BODY MASS INDEX: 24.3 KG/M2 | OXYGEN SATURATION: 100 % | DIASTOLIC BLOOD PRESSURE: 67 MMHG | HEART RATE: 66 BPM

## 2022-04-14 DIAGNOSIS — Z90.49 S/P LAPAROSCOPIC CHOLECYSTECTOMY: Primary | ICD-10-CM

## 2022-04-14 PROCEDURE — 3008F BODY MASS INDEX DOCD: CPT | Mod: CPTII,S$GLB,, | Performed by: STUDENT IN AN ORGANIZED HEALTH CARE EDUCATION/TRAINING PROGRAM

## 2022-04-14 PROCEDURE — 3078F PR MOST RECENT DIASTOLIC BLOOD PRESSURE < 80 MM HG: ICD-10-PCS | Mod: CPTII,S$GLB,, | Performed by: STUDENT IN AN ORGANIZED HEALTH CARE EDUCATION/TRAINING PROGRAM

## 2022-04-14 PROCEDURE — 3074F SYST BP LT 130 MM HG: CPT | Mod: CPTII,S$GLB,, | Performed by: STUDENT IN AN ORGANIZED HEALTH CARE EDUCATION/TRAINING PROGRAM

## 2022-04-14 PROCEDURE — 99024 POSTOP FOLLOW-UP VISIT: CPT | Mod: S$GLB,,, | Performed by: STUDENT IN AN ORGANIZED HEALTH CARE EDUCATION/TRAINING PROGRAM

## 2022-04-14 PROCEDURE — 1159F MED LIST DOCD IN RCRD: CPT | Mod: CPTII,S$GLB,, | Performed by: STUDENT IN AN ORGANIZED HEALTH CARE EDUCATION/TRAINING PROGRAM

## 2022-04-14 PROCEDURE — 99024 PR POST-OP FOLLOW-UP VISIT: ICD-10-PCS | Mod: S$GLB,,, | Performed by: STUDENT IN AN ORGANIZED HEALTH CARE EDUCATION/TRAINING PROGRAM

## 2022-04-14 PROCEDURE — 1159F PR MEDICATION LIST DOCUMENTED IN MEDICAL RECORD: ICD-10-PCS | Mod: CPTII,S$GLB,, | Performed by: STUDENT IN AN ORGANIZED HEALTH CARE EDUCATION/TRAINING PROGRAM

## 2022-04-14 PROCEDURE — 3074F PR MOST RECENT SYSTOLIC BLOOD PRESSURE < 130 MM HG: ICD-10-PCS | Mod: CPTII,S$GLB,, | Performed by: STUDENT IN AN ORGANIZED HEALTH CARE EDUCATION/TRAINING PROGRAM

## 2022-04-14 PROCEDURE — 99999 PR PBB SHADOW E&M-EST. PATIENT-LVL III: CPT | Mod: PBBFAC,,, | Performed by: STUDENT IN AN ORGANIZED HEALTH CARE EDUCATION/TRAINING PROGRAM

## 2022-04-14 PROCEDURE — 3008F PR BODY MASS INDEX (BMI) DOCUMENTED: ICD-10-PCS | Mod: CPTII,S$GLB,, | Performed by: STUDENT IN AN ORGANIZED HEALTH CARE EDUCATION/TRAINING PROGRAM

## 2022-04-14 PROCEDURE — 99999 PR PBB SHADOW E&M-EST. PATIENT-LVL III: ICD-10-PCS | Mod: PBBFAC,,, | Performed by: STUDENT IN AN ORGANIZED HEALTH CARE EDUCATION/TRAINING PROGRAM

## 2022-04-14 PROCEDURE — 3078F DIAST BP <80 MM HG: CPT | Mod: CPTII,S$GLB,, | Performed by: STUDENT IN AN ORGANIZED HEALTH CARE EDUCATION/TRAINING PROGRAM

## 2022-04-14 NOTE — PROGRESS NOTES
"General Surgery Clinic Note    Chief Complaint: Routine postoperative followup      Subjective:  Mr. Marrufo returns to clinic for routine postoperative visit after undergoing lap cholecystectomy on 04/01/22.    He has no acute complaints; he is tolerating a diet without issue, pain is minimal and controlled with OTC medications, ambulating and performing ADLs without issue, and incisions are clean/dry/intact with no gross signs of infection.     He does complain of some mild, intermittent epigastric pain that is relieved with food.     ROS:  Pertinent items from 14 system review are noted in HPI, all others negative     Objective:  /67 (BP Location: Left arm, Patient Position: Sitting)   Pulse 66   Ht 5' 10" (1.778 m)   Wt 77 kg (169 lb 12.1 oz)   SpO2 100%   BMI 24.36 kg/m²     Physical Exam:  Gen: no acute distress, alert and oriented  Abd: Soft, nontender, nondistended  Extr: moves all extremities well  Incisions: Clean, dry, and intact with no gross signs of infection    Pathology:  Final Pathologic Diagnosis RELIAPATH DIAGNOSIS:   GALLBLADDER, CHOLECYSTECTOMY:   Acute gangrenous cholecystitis with cholelithiasis.     Assessment:   Mr. Marrufo returns to clinic for routine postoperative visit after undergoing lap cholecystectomy on 04/01/22.  He has no acute complaints and is doing well in the postoperative period. We will plan to followup PRN.   For the mild epigastric pain, we discussed possible gastritis as the source. Patient will discuss options for management with PCP, and possibly attempt to schedule an EGD with upcoming colonoscopy scheduled in May.   We provided Mr. Marrufo with contact information to our clinic and he was informed to contact us without hesitation should he have any questions or concerns. All of his questions were answered before leaving clinic.      Plan:  -Continue local wound care  -RTC PRN    Tuan Bashir MD  Acute Care Surgery and Surgical Critical Care  [facility]  4/14/2022

## 2022-04-19 ENCOUNTER — SPECIALTY PHARMACY (OUTPATIENT)
Dept: PHARMACY | Facility: CLINIC | Age: 42
End: 2022-04-19
Payer: COMMERCIAL

## 2022-04-19 ENCOUNTER — LAB VISIT (OUTPATIENT)
Dept: LAB | Facility: HOSPITAL | Age: 42
End: 2022-04-19
Payer: COMMERCIAL

## 2022-04-19 ENCOUNTER — OFFICE VISIT (OUTPATIENT)
Dept: INFECTIOUS DISEASES | Facility: CLINIC | Age: 42
End: 2022-04-19
Payer: COMMERCIAL

## 2022-04-19 VITALS
TEMPERATURE: 98 F | RESPIRATION RATE: 16 BRPM | WEIGHT: 171.31 LBS | HEART RATE: 61 BPM | BODY MASS INDEX: 24.58 KG/M2 | DIASTOLIC BLOOD PRESSURE: 77 MMHG | SYSTOLIC BLOOD PRESSURE: 116 MMHG

## 2022-04-19 DIAGNOSIS — Z21 HIV POSITIVE: Primary | ICD-10-CM

## 2022-04-19 DIAGNOSIS — Z21 HIV POSITIVE: ICD-10-CM

## 2022-04-19 DIAGNOSIS — B20 HUMAN IMMUNODEFICIENCY VIRUS (HIV) DISEASE: Primary | ICD-10-CM

## 2022-04-19 LAB
ALBUMIN SERPL BCP-MCNC: 3.9 G/DL (ref 3.5–5.2)
ALP SERPL-CCNC: 78 U/L (ref 55–135)
ALT SERPL W/O P-5'-P-CCNC: 9 U/L (ref 10–44)
ANION GAP SERPL CALC-SCNC: 13 MMOL/L (ref 8–16)
AST SERPL-CCNC: 17 U/L (ref 10–40)
BILIRUB SERPL-MCNC: 0.7 MG/DL (ref 0.1–1)
BILIRUB UR QL STRIP: NEGATIVE
BUN SERPL-MCNC: 10 MG/DL (ref 6–20)
CALCIUM SERPL-MCNC: 9.9 MG/DL (ref 8.7–10.5)
CHLORIDE SERPL-SCNC: 100 MMOL/L (ref 95–110)
CLARITY UR REFRACT.AUTO: CLEAR
CO2 SERPL-SCNC: 27 MMOL/L (ref 23–29)
COLOR UR AUTO: YELLOW
CREAT SERPL-MCNC: 0.9 MG/DL (ref 0.5–1.4)
EST. GFR  (AFRICAN AMERICAN): >60 ML/MIN/1.73 M^2
EST. GFR  (NON AFRICAN AMERICAN): >60 ML/MIN/1.73 M^2
GLUCOSE SERPL-MCNC: 80 MG/DL (ref 70–110)
GLUCOSE UR QL STRIP: NEGATIVE
HGB UR QL STRIP: NEGATIVE
KETONES UR QL STRIP: NEGATIVE
LEUKOCYTE ESTERASE UR QL STRIP: NEGATIVE
NITRITE UR QL STRIP: NEGATIVE
PH UR STRIP: 6 [PH] (ref 5–8)
POTASSIUM SERPL-SCNC: 4.1 MMOL/L (ref 3.5–5.1)
PROT SERPL-MCNC: 8.3 G/DL (ref 6–8.4)
PROT UR QL STRIP: NEGATIVE
SODIUM SERPL-SCNC: 140 MMOL/L (ref 136–145)
SP GR UR STRIP: 1.02 (ref 1–1.03)
URN SPEC COLLECT METH UR: NORMAL

## 2022-04-19 PROCEDURE — 87901 NFCT AGT GNTYP ALYS HIV1 REV: CPT | Performed by: STUDENT IN AN ORGANIZED HEALTH CARE EDUCATION/TRAINING PROGRAM

## 2022-04-19 PROCEDURE — 81381 HLA I TYPING 1 ALLELE HR: CPT | Mod: PO | Performed by: STUDENT IN AN ORGANIZED HEALTH CARE EDUCATION/TRAINING PROGRAM

## 2022-04-19 PROCEDURE — 81003 URINALYSIS AUTO W/O SCOPE: CPT | Performed by: STUDENT IN AN ORGANIZED HEALTH CARE EDUCATION/TRAINING PROGRAM

## 2022-04-19 PROCEDURE — 87536 HIV-1 QUANT&REVRSE TRNSCRPJ: CPT | Mod: 59 | Performed by: STUDENT IN AN ORGANIZED HEALTH CARE EDUCATION/TRAINING PROGRAM

## 2022-04-19 PROCEDURE — 86403 PARTICLE AGGLUT ANTBDY SCRN: CPT | Performed by: STUDENT IN AN ORGANIZED HEALTH CARE EDUCATION/TRAINING PROGRAM

## 2022-04-19 PROCEDURE — 86787 VARICELLA-ZOSTER ANTIBODY: CPT | Performed by: STUDENT IN AN ORGANIZED HEALTH CARE EDUCATION/TRAINING PROGRAM

## 2022-04-19 PROCEDURE — 86780 TREPONEMA PALLIDUM: CPT | Performed by: STUDENT IN AN ORGANIZED HEALTH CARE EDUCATION/TRAINING PROGRAM

## 2022-04-19 PROCEDURE — 87340 HEPATITIS B SURFACE AG IA: CPT | Performed by: STUDENT IN AN ORGANIZED HEALTH CARE EDUCATION/TRAINING PROGRAM

## 2022-04-19 PROCEDURE — 99204 OFFICE O/P NEW MOD 45 MIN: CPT | Mod: S$GLB,,, | Performed by: STUDENT IN AN ORGANIZED HEALTH CARE EDUCATION/TRAINING PROGRAM

## 2022-04-19 PROCEDURE — 80053 COMPREHEN METABOLIC PANEL: CPT | Performed by: STUDENT IN AN ORGANIZED HEALTH CARE EDUCATION/TRAINING PROGRAM

## 2022-04-19 PROCEDURE — 86361 T CELL ABSOLUTE COUNT: CPT | Performed by: STUDENT IN AN ORGANIZED HEALTH CARE EDUCATION/TRAINING PROGRAM

## 2022-04-19 PROCEDURE — 99204 PR OFFICE/OUTPT VISIT, NEW, LEVL IV, 45-59 MIN: ICD-10-PCS | Mod: S$GLB,,, | Performed by: STUDENT IN AN ORGANIZED HEALTH CARE EDUCATION/TRAINING PROGRAM

## 2022-04-19 PROCEDURE — 99999 PR PBB SHADOW E&M-EST. PATIENT-LVL III: ICD-10-PCS | Mod: PBBFAC,,, | Performed by: STUDENT IN AN ORGANIZED HEALTH CARE EDUCATION/TRAINING PROGRAM

## 2022-04-19 PROCEDURE — 86704 HEP B CORE ANTIBODY TOTAL: CPT | Performed by: STUDENT IN AN ORGANIZED HEALTH CARE EDUCATION/TRAINING PROGRAM

## 2022-04-19 PROCEDURE — 82955 ASSAY OF G6PD ENZYME: CPT | Performed by: STUDENT IN AN ORGANIZED HEALTH CARE EDUCATION/TRAINING PROGRAM

## 2022-04-19 PROCEDURE — 99999 PR PBB SHADOW E&M-EST. PATIENT-LVL III: CPT | Mod: PBBFAC,,, | Performed by: STUDENT IN AN ORGANIZED HEALTH CARE EDUCATION/TRAINING PROGRAM

## 2022-04-19 PROCEDURE — 86777 TOXOPLASMA ANTIBODY: CPT | Performed by: STUDENT IN AN ORGANIZED HEALTH CARE EDUCATION/TRAINING PROGRAM

## 2022-04-19 PROCEDURE — 86790 VIRUS ANTIBODY NOS: CPT | Performed by: STUDENT IN AN ORGANIZED HEALTH CARE EDUCATION/TRAINING PROGRAM

## 2022-04-19 PROCEDURE — 87906 NFCT AGT GNTYP ALYS HIV1: CPT | Performed by: STUDENT IN AN ORGANIZED HEALTH CARE EDUCATION/TRAINING PROGRAM

## 2022-04-19 PROCEDURE — 86592 SYPHILIS TEST NON-TREP QUAL: CPT | Performed by: STUDENT IN AN ORGANIZED HEALTH CARE EDUCATION/TRAINING PROGRAM

## 2022-04-19 PROCEDURE — 86706 HEP B SURFACE ANTIBODY: CPT | Performed by: STUDENT IN AN ORGANIZED HEALTH CARE EDUCATION/TRAINING PROGRAM

## 2022-04-19 PROCEDURE — 86803 HEPATITIS C AB TEST: CPT | Performed by: STUDENT IN AN ORGANIZED HEALTH CARE EDUCATION/TRAINING PROGRAM

## 2022-04-19 NOTE — PROGRESS NOTES
INFECTIOUS DISEASE CLINIC  04/19/2022     Subjective:      Chief Complaint: HIV    History of Present Illness:    41-year-old male with HIV here to establish care.    Initially diagnosed in [place] 2009, believes it was from a former partner.  Started Atripla later that year.  Due to GI symptoms, later switched to Complera.  Afterwards he moved to [place] 2017/2018, where his new HIV provider switched him to Triumeq.  Was very adherent to medications and reportedly undetectable the entire time.  Moved to [place] 2019 and had kept putting off establishing care with an HIV provider until 2 years had inadvertently passed.  States COVID-19 pandemic had also made establishing care difficult.  Has been off medications since running out of Triumeq from [place].    Recent cholecystectomy on 4/1/22 for biliary colic (path later showed gangrenous cholecystitis).  Experiencing diarrhea since then which has steadily improved.  ~40-lbs weight loss since December 2021, initially intentional but now unintentional.  Denies fevers, chills, night sweats, headache, blurred vision, difficulty swallowing, cough, SOB, abdominal pain, dysuria, rash.     since 2019, partner also HIV positive and well controlled on triumeq.  Daily sexual activities including receptive/penetrative oral and anal.    Owns 2 dogs, works at [employer].  No outdoor activities/hobbies.  Lives in [place].  Parents aware of his diagnosis.  Occasional drinking, smokes 1 black and mild daily, no elicit drug use.  Dad's side with DM, mother had breast cancer.      Review of Systems   Constitutional: Positive for weight loss. Negative for chills, fever and night sweats.   HENT: Negative for odynophagia and sore throat.    Eyes: Negative for redness and visual disturbance.   Cardiovascular: Negative for chest pain.   Respiratory: Negative for cough, shortness of breath and sputum production.    Hematologic/Lymphatic: Negative for  adenopathy.   Skin: Negative for rash and suspicious lesions.   Musculoskeletal: Negative for joint swelling and neck pain.   Gastrointestinal: Positive for diarrhea (improving). Negative for abdominal pain, anorexia, nausea and vomiting.   Genitourinary: Negative for flank pain.   Neurological: Negative for headaches.   Psychiatric/Behavioral: Negative for altered mental status, depression, hallucinations and hypervigilance.         Past Medical History:   Diagnosis Date    HIV (human immunodeficiency virus infection)      Past Surgical History:   Procedure Laterality Date    LAPAROSCOPIC CHOLECYSTECTOMY N/A 4/1/2022    Procedure: CHOLECYSTECTOMY, LAPAROSCOPIC;  Surgeon: Tuan Bashir MD;  [facility] [address] [place] [address];  Service: General;  Laterality: N/A;     No family history on file.  Social History     Tobacco Use    Smoking status: Current Every Day Smoker     Types: Cigars    Smokeless tobacco: Never Used   Substance Use Topics    Alcohol use: Yes    Drug use: Never       Review of patient's allergies indicates:  No Known Allergies      Objective:   VS (24h):   Vitals:    04/19/22 1031   BP: 116/77   Pulse: 61   Resp: 16   Temp: 98.2 °F (36.8 °C)         Physical Exam  Vitals reviewed.   Constitutional:       General: He is not in acute distress.     Appearance: He is normal weight. He is not ill-appearing or toxic-appearing.   HENT:      Head: Normocephalic and atraumatic.      Right Ear: External ear normal. There is no impacted cerumen.      Left Ear: External ear normal. There is no impacted cerumen.      Nose: Nose normal. No congestion.      Mouth/Throat:      Mouth: Mucous membranes are moist.      Pharynx: Oropharynx is clear. No oropharyngeal exudate or posterior oropharyngeal erythema.   Eyes:      General: No scleral icterus.        Right eye: No discharge.         Left eye: No discharge.      Extraocular Movements: Extraocular movements intact.      Conjunctiva/sclera: Conjunctivae normal.    Cardiovascular:      Rate and Rhythm: Normal rate and regular rhythm.      Heart sounds: Normal heart sounds. No murmur heard.  Pulmonary:      Effort: Pulmonary effort is normal. No respiratory distress.      Breath sounds: Normal breath sounds. No wheezing, rhonchi or rales.   Abdominal:      General: Abdomen is flat. There is no distension.      Palpations: Abdomen is soft.      Tenderness: There is no abdominal tenderness. There is no guarding or rebound.   Musculoskeletal:         General: No deformity. Normal range of motion.      Cervical back: Normal range of motion.      Right lower leg: No edema.      Left lower leg: No edema.   Lymphadenopathy:      Cervical: No cervical adenopathy.   Skin:     General: Skin is warm and dry.      Coloration: Skin is not jaundiced.      Findings: No erythema, lesion or rash.   Neurological:      Mental Status: He is alert and oriented to person, place, and time. Mental status is at baseline.      Cranial Nerves: No cranial nerve deficit.      Motor: No weakness.      Coordination: Coordination normal.      Gait: Gait normal.   Psychiatric:         Mood and Affect: Mood normal.         Behavior: Behavior normal.         Thought Content: Thought content normal.         Judgment: Judgment normal.           Labs:  Reviewed    Micro:   None    Radiology:   Reviewed    Immunization History   Administered Date(s) Administered    COVID-19, MRNA, LN-S, PF (MODERNA FULL 0.5 ML DOSE) 01/07/2021, 02/04/2021    COVID-19, MRNA, LN-S, PF (Pfizer) (Purple Cap) 12/14/2021         Assessment & Plan:     1. HIV positive  - CD4 T-Dixon Cells; Future  - Comprehensive Metabolic Panel; Future  - HIV RNA, Quantitative, PCR; Future  - Hepatitis A antibody, IgG; Future  - Hepatitis B Surface Ab, Qualitative; Future  - Hepatitis B Core Antibody, Total; Future  - Hepatitis B Surface Antigen; Future  - Hepatitis C Antibody; Future  - Cryptococcal antigen, blood; Future  - G6PD,Quantitative;  Future  - HIV-1 GenotypR PLUS; Future  - HIV-1 Genotypic Integrase Resistance; Future  - HLA ; Future  - Quantiferon Gold TB; Future  - Toxoplasma Gondii IgG; Future  - Urinalysis; Future  - Varicella Zoster Antibody, IgG; Future  - RPR; Future  - FTA antibodies, IgG and IgM; Future  - ccpxzyucg-ilaebqga-zxobcma ala (BIKTARVY) -25 mg (25 kg or greater); Take 1 tablet by mouth once daily.  Dispense: 30 tablet; Refill: 2  - Urinalysis     Previously adherent to medications but off since moving to [place] in 2019. Some unintentional weight loss, no other systemic symptoms (diarrhea improving, 2/2 recent CCY).  Unknown CD4 and viral load currently.  -Last HIV provider was Dr. Regis Galaviz of [place], [place].  Will request records from him.  -Reportedly up-to-date on all needed vaccinations during time he last saw Dr. Galaviz.  Will hold off on vaccinations until records seen.  -Will order HIV entry labs  -Will start Biktarvy  -Patient prefers to have STD testing performed next visit.    Follow up in 1 month.      Abundio Colby MD  Infectious Disease Fellow

## 2022-04-19 NOTE — TELEPHONE ENCOUNTER
Pt called for status update on Biktarvy.  Per test claim, no PA needed.  $150 copay. Advised him that [facility] will do benefit review.   Pt agreed to copay card.   Routing to ID team for review and assignment.

## 2022-04-20 ENCOUNTER — SPECIALTY PHARMACY (OUTPATIENT)
Dept: PHARMACY | Facility: CLINIC | Age: 42
End: 2022-04-20
Payer: COMMERCIAL

## 2022-04-20 DIAGNOSIS — B20 HUMAN IMMUNODEFICIENCY VIRUS (HIV) DISEASE: Primary | ICD-10-CM

## 2022-04-20 LAB
CD3+CD4+ CELLS # BLD: 574 CELLS/UL (ref 300–1400)
CD3+CD4+ CELLS NFR BLD: 28.9 % (ref 28–57)
CRYPTOC AG SER QL LA: NEGATIVE
G6PD RBC-CCNT: 7.1 U/G HB (ref 8–11.9)
HAV IGG SER QL IA: POSITIVE
HBV CORE AB SERPL QL IA: NEGATIVE
HBV SURFACE AB SER-ACNC: POSITIVE M[IU]/ML
HBV SURFACE AG SERPL QL IA: NEGATIVE
HCV AB SERPL QL IA: NEGATIVE
RPR SER QL: NORMAL
T GONDII IGG SER QL IA: NORMAL
T GONDII IGG SERPL IA-ACNC: <5 IU/ML (ref 0–6.4)
T PALLIDUM AB SER QL IF: REACTIVE

## 2022-04-20 NOTE — TELEPHONE ENCOUNTER
JEANNIE-Biktarvy   [facility]  [facility] in network    Deductible-None  OOPmax- $4,750 ($54.32 met)  Copay $150     Copay card obtain in [place] - $0 -$7,200/year

## 2022-04-20 NOTE — TELEPHONE ENCOUNTER
Opened referral for new Rx for Biktarvy. Labwork is in process. Will proceed with urgent benefits investigation and complete clinical review then plan for initial consult.

## 2022-04-20 NOTE — TELEPHONE ENCOUNTER
Specialty Pharmacy - Initial Clinical Assessment    Specialty Medication Orders Linked to Encounter    Flowsheet Row Most Recent Value   Medication #1 qwtkvdfqq-kkotsrbg-ehiyxem ala (BIKTARVY) -25 mg (25 kg or greater) (Order#[id number], Rx#[id number])        Patient Diagnosis   b20    Subjective    Lenard Marrufo is a 41 y.o. male, who is followed by the specialty pharmacy service for management and education.    Recent Encounters     Date Type Provider Description    04/20/2022 Specialty Pharmacy Ann Dai PharmD Initial Clinical Assessment    04/19/2022 Specialty Pharmacy Sadie Hernandez PharmD Referral Authorization        Clinical call attempts since last clinical assessment   4/20/2022  6:19 PM - Specialty Pharmacy - Clinical Assessment by Ann Dia PharmD     Current Outpatient Medications   Medication Sig    dahenoxua-jhpssimm-equtwom ala (BIKTARVY) -25 mg (25 kg or greater) Take 1 tablet by mouth once daily.   Last reviewed on 4/20/2022  1:25 PM by Ann Dia PharmD    Review of patient's allergies indicates:  No Known AllergiesLast reviewed on  4/20/2022 1:25 PM by Ann Dia    Drug Interactions    Drug interactions evaluated: yes  Clinically relevant drug interactions identified: no  Provided the patient with educational material regarding drug interactions: not applicable         Adverse Effects    *All other systems reviewed and are negative       Assessment Questions - Documented Responses    Flowsheet Row Most Recent Value   Assessment    Medication Reconciliation completed for patient Yes   During the past 4 weeks, has patient missed any activities due to condition or medication? No   During the past 4 weeks, did patient have any of the following urgent care visits? Hospital Admission   Goals of Therapy Status Discussed (new start)   Status of the patients ability to self-administer: Is Able   All education points have been covered with patient? No, patient  "declined- printed education provided   Welcome packet contents reviewed and discussed with patient? Yes   Assesment completed? Yes   Plan Therapy being initiated   Do you need to open a clinical intervention (i-vent)? No   Do you want to schedule first shipment? Yes   Medication #1 Assessment Info    Patient status New medication, New to [facility]   Is this medication appropriate for the patient? Yes   Is this medication effective? Not yet started        Refill Questions - Documented Responses    Flowsheet Row Most Recent Value   Refill Screening Questions    When does the patient need to receive the medication? 04/20/22   Refill Delivery Questions    How will the patient receive the medication? Pickup   When does the patient need to receive the medication? 04/20/22   Address in [facility] confirmed and updated if neccessary? No   Expected Copay ($) 0   Is the patient able to afford the medication copay? Yes   Payment Method zero copay   Days supply of Refill 30   Supplies needed? No supplies needed   Refill activity completed? Yes   Refill activity plan Refill scheduled   Shipment/Pickup Date: 04/20/22          Objective    He has a past medical history of HIV (human immunodeficiency virus infection).    Treatment: experienced  Prior Regimens: 2009: Dx and initiated on Atripla. abdominal symptoms, switched to Complera FTC, TDF, Rilpivirine; 2017: Triumeq; Prior medications put him in a "mental fog"    BP Readings from Last 4 Encounters:   04/19/22 116/77   04/14/22 108/67   04/01/22 130/82   03/25/22 131/79     Ht Readings from Last 4 Encounters:   04/14/22 5' 10" (1.778 m)   04/01/22 5' 10" (1.778 m)   03/25/22 5' 10" (1.778 m)   03/21/22 5' 10" (1.778 m)     Wt Readings from Last 4 Encounters:   04/19/22 77.7 kg (171 lb 4.8 oz)   04/14/22 77 kg (169 lb 12.1 oz)   04/01/22 77.1 kg (170 lb)   03/25/22 78.9 kg (173 lb 15.1 oz)     Recent Labs   Lab Result Units 04/19/22  1211   Creatinine mg/dL 0.9   ALT U/L 9 L "   AST U/L 17   Absolute CD4 cells/ul 574     HIV VL  (in process)   CD4+ 574 cells/uL  LFTs: AST 17, ALT 9  Renal: eGFR 60.0 mL/min/1.73 m^2 ()  SCr: 0.9 mg/dL (4/19/22)  HBV: HBsAg (-), HBcAb (-), HBsAb (-), susceptible  HAV Ab positive, immune  HIV GenotypeR PLUS: (in process)  HIV Integrase Resistance: (in process)  HLA  in process    Appropriate based on HIV Guidelines for the Use of Antiretroviral Agents in Adults and Adolescents Living with HIV  The goals of treatment for Human Immunodeficiency Disease (HIV) treatment include:  · Reducing HIV-associated morbidity and mortality  · Restoring and preserving immunologic function  · Suppressing and maintaining HIV viral load to undetectable levels  · Preventing HIV transmission  · Improving or maintaining quality of life  · Maintaining optimal therapy adherence  · Minimizing and managing side effects  · Promoting adequate nutrition  · Encouraging proper hydration    Goals of Therapy Status: Discussed (new start)    Assessment/Plan  Patient plans to start therapy on 04/20/22. Indication, dosage, appropriateness, effectiveness, safety and convenience of his specialty medication(s) were reviewed today.     Patient Education   Pharmacist offer to  patient was declined. Printed educational materials will be provided with medication.  Patient did accept verbal education on the following topics:  · Proper use, timely administration, and missed dose management  · Side effects, including prevention, minimization, and management    Reminder for PharmD set for next week to check back if labs in process have resulted to further assess therapy appropriateness.     Tasks added this encounter   5/13/2022 - Refill Call (Auto Added)  4/21/2022 - Pickup Reminder  1/20/2023 - Clinical - Follow Up Assesement (Annual)   Tasks due within next 3 months   No tasks due.     Ann Dia, PharmD  [facility]  [address] [facility]  [address]  Phone: [phone]  Fax: [phone]

## 2022-04-21 LAB
HIV1 RNA # PLAS NAA DL=20: ABNORMAL COPIES/ML
VARICELLA INTERPRETATION: POSITIVE
VARICELLA ZOSTER IGG: 2.8 ISR (ref 0–0.9)

## 2022-04-27 ENCOUNTER — PATIENT MESSAGE (OUTPATIENT)
Dept: INFECTIOUS DISEASES | Facility: CLINIC | Age: 42
End: 2022-04-27
Payer: COMMERCIAL

## 2022-04-27 DIAGNOSIS — A52.8 LATE LATENT SYPHILIS: Primary | ICD-10-CM

## 2022-04-27 NOTE — TELEPHONE ENCOUNTER
Spoke to patient and discussed labwork results.  Just received Biktary, will start taking medication.  Informed that the resistance testing takes some time to result, still in process.    Discussed FTA-Abs, the patient has never received treatment for syphilis before, currently asymptomatic.  Partner gets tested regularly for STDs.  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  Will make for a follow-up appointment in 1 month.

## 2022-05-02 LAB — HIV GENTYP ISLT: DETECTED

## 2022-05-04 LAB
BICTEGRAVIR ISLT GENOTYP: NORMAL
COLLECT DATE: NORMAL
DOLUTEGRAVIR ISLT GENOTYP: NORMAL
ELVITEGRAVIR ISLT GENOTYP: NORMAL
RALTEGRAVIR ISLT GENOTYP: NORMAL
VALUE OF LAST VIRAL LOAD: NORMAL COPIES/ML

## 2022-05-12 LAB
B5701 INTERPRETATION: NORMAL
HLA-B27 RELATED AG QL: NEGATIVE
HLA-B27 RELATED AG QL: NORMAL

## 2022-05-13 ENCOUNTER — SPECIALTY PHARMACY (OUTPATIENT)
Dept: PHARMACY | Facility: CLINIC | Age: 42
End: 2022-05-13
Payer: COMMERCIAL

## 2022-05-13 NOTE — TELEPHONE ENCOUNTER
Specialty Pharmacy - Refill Coordination    Specialty Medication Orders Linked to Encounter    Flowsheet Row Most Recent Value   Medication #1 xkbxkdnou-qzbhofyf-cvvggjg ala (BIKTARVY) -25 mg (25 kg or greater) (Order#[id number], Rx#[id number])          Refill Questions - Documented Responses    Flowsheet Row Most Recent Value   Patient Availability and HIPAA Verification    Does patient want to proceed with activity? Yes   HIPAA/medical authority confirmed? Yes   Relationship to patient of person spoken to? Self   Refill Screening Questions    Changes to allergies? No   Changes to medications? No   New conditions since last clinic visit? No   Unplanned office visit, urgent care, ED, or hospital admission in the last 4 weeks? No   How does patient/caregiver feel medication is working? Good   Financial problems or insurance changes? No   How many doses of your specialty medications were missed in the last 4 weeks? 0   Would patient like to speak to a pharmacist? No   When does the patient need to receive the medication? 05/18/22   Refill Delivery Questions    How will the patient receive the medication? Pickup   When does the patient need to receive the medication? 05/18/22   Shipping Address Home   Address in [facility] confirmed and updated if neccessary? Yes   Expected Copay ($) 0   Is the patient able to afford the medication copay? Yes   Payment Method zero copay   Days supply of Refill 30   Supplies needed? No supplies needed   Refill activity completed? Yes   Refill activity plan Refill scheduled   Shipment/Pickup Date: 05/18/22          Current Outpatient Medications   Medication Sig    cfhxwwsre-lyuxyxjd-afyqjmm ala (BIKTARVY) -25 mg (25 kg or greater) Take 1 tablet by mouth once daily.   Last reviewed on 4/20/2022  1:25 PM by Ann Dia, PharmD    Review of patient's allergies indicates:  No Known Allergies Last reviewed on  4/27/2022 2:05 PM by Abundio Colby      Tasks added this  encounter   6/10/2022 - Refill Call (Auto Added)  5/19/2022 - Pickup Reminder   Tasks due within next 3 months   No tasks due.     [facility]  [address] [place] [address]  Phone: [phone]  Fax: [phone]

## 2022-05-19 ENCOUNTER — PATIENT MESSAGE (OUTPATIENT)
Dept: PHARMACY | Facility: CLINIC | Age: 42
End: 2022-05-19
Payer: COMMERCIAL

## 2022-05-24 ENCOUNTER — SPECIALTY PHARMACY (OUTPATIENT)
Dept: PHARMACY | Facility: CLINIC | Age: 42
End: 2022-05-24
Payer: COMMERCIAL

## 2022-06-02 ENCOUNTER — PATIENT MESSAGE (OUTPATIENT)
Dept: PHARMACY | Facility: CLINIC | Age: 42
End: 2022-06-02
Payer: COMMERCIAL

## 2022-06-08 NOTE — TELEPHONE ENCOUNTER
Specialty Pharmacy - Refill Coordination    Specialty Medication Orders Linked to Encounter    Flowsheet Row Most Recent Value   Medication #1 mymccgphp-rcfcqfit-rzmjqmf ala (BIKTARVY) -25 mg (25 kg or greater) (Order#[id number], Rx#[id number])        Refill Questions - Documented Responses    Flowsheet Row Most Recent Value   Patient Availability and HIPAA Verification    Does patient want to proceed with activity? Unable to Reach        We have had multiple attempts to the patient and have been unsuccessful to reach the patient. We will stop reaching out to the patient but in the event that the patient needs the med and contacts us, we will communicate and begin dispensing for the patient. At your next visit with the patient, please review the importance of being in contact with our specialty pharmacy as a part of our care team.      [facility] - Specialty Pharmacy  [address] [facility] [address]  Phone: [phone]  Fax: [phone]

## 2022-08-05 ENCOUNTER — OFFICE VISIT (OUTPATIENT)
Dept: INFECTIOUS DISEASES | Facility: CLINIC | Age: 42
End: 2022-08-05
Payer: COMMERCIAL

## 2022-08-05 ENCOUNTER — TELEPHONE (OUTPATIENT)
Dept: INFECTIOUS DISEASES | Facility: CLINIC | Age: 42
End: 2022-08-05
Payer: COMMERCIAL

## 2022-08-05 ENCOUNTER — LAB VISIT (OUTPATIENT)
Dept: LAB | Facility: HOSPITAL | Age: 42
End: 2022-08-05
Attending: INTERNAL MEDICINE
Payer: COMMERCIAL

## 2022-08-05 VITALS
DIASTOLIC BLOOD PRESSURE: 81 MMHG | SYSTOLIC BLOOD PRESSURE: 130 MMHG | HEIGHT: 70 IN | TEMPERATURE: 99 F | WEIGHT: 175.06 LBS | HEART RATE: 85 BPM | BODY MASS INDEX: 25.06 KG/M2

## 2022-08-05 DIAGNOSIS — Z21 HIV POSITIVE: Primary | ICD-10-CM

## 2022-08-05 DIAGNOSIS — A52.8 LATE LATENT SYPHILIS: ICD-10-CM

## 2022-08-05 DIAGNOSIS — Z21 HIV POSITIVE: ICD-10-CM

## 2022-08-05 LAB
ALBUMIN SERPL BCP-MCNC: 3.8 G/DL (ref 3.5–5.2)
ALP SERPL-CCNC: 74 U/L (ref 55–135)
ALT SERPL W/O P-5'-P-CCNC: 6 U/L (ref 10–44)
ANION GAP SERPL CALC-SCNC: 11 MMOL/L (ref 8–16)
AST SERPL-CCNC: 15 U/L (ref 10–40)
BASOPHILS # BLD AUTO: 0.04 K/UL (ref 0–0.2)
BASOPHILS NFR BLD: 0.5 % (ref 0–1.9)
BILIRUB SERPL-MCNC: 0.3 MG/DL (ref 0.1–1)
BILIRUB UR QL STRIP: NEGATIVE
BUN SERPL-MCNC: 9 MG/DL (ref 6–20)
CALCIUM SERPL-MCNC: 9.4 MG/DL (ref 8.7–10.5)
CHLORIDE SERPL-SCNC: 101 MMOL/L (ref 95–110)
CLARITY UR REFRACT.AUTO: CLEAR
CO2 SERPL-SCNC: 26 MMOL/L (ref 23–29)
COLOR UR AUTO: YELLOW
CREAT SERPL-MCNC: 1.1 MG/DL (ref 0.5–1.4)
DIFFERENTIAL METHOD: ABNORMAL
EOSINOPHIL # BLD AUTO: 0.1 K/UL (ref 0–0.5)
EOSINOPHIL NFR BLD: 1.4 % (ref 0–8)
ERYTHROCYTE [DISTWIDTH] IN BLOOD BY AUTOMATED COUNT: 14.6 % (ref 11.5–14.5)
EST. GFR  (NO RACE VARIABLE): >60 ML/MIN/1.73 M^2
GLUCOSE SERPL-MCNC: 115 MG/DL (ref 70–110)
GLUCOSE UR QL STRIP: NEGATIVE
HCT VFR BLD AUTO: 46.8 % (ref 40–54)
HGB BLD-MCNC: 16.9 G/DL (ref 14–18)
HGB UR QL STRIP: NEGATIVE
IMM GRANULOCYTES # BLD AUTO: 0.01 K/UL (ref 0–0.04)
IMM GRANULOCYTES NFR BLD AUTO: 0.1 % (ref 0–0.5)
KETONES UR QL STRIP: ABNORMAL
LEUKOCYTE ESTERASE UR QL STRIP: ABNORMAL
LYMPHOCYTES # BLD AUTO: 2.1 K/UL (ref 1–4.8)
LYMPHOCYTES NFR BLD: 24.5 % (ref 18–48)
MCH RBC QN AUTO: 31.9 PG (ref 27–31)
MCHC RBC AUTO-ENTMCNC: 36.1 G/DL (ref 32–36)
MCV RBC AUTO: 89 FL (ref 82–98)
MICROSCOPIC COMMENT: NORMAL
MONOCYTES # BLD AUTO: 0.8 K/UL (ref 0.3–1)
MONOCYTES NFR BLD: 8.9 % (ref 4–15)
NEUTROPHILS # BLD AUTO: 5.7 K/UL (ref 1.8–7.7)
NEUTROPHILS NFR BLD: 64.6 % (ref 38–73)
NITRITE UR QL STRIP: NEGATIVE
NRBC BLD-RTO: 0 /100 WBC
PH UR STRIP: 5 [PH] (ref 5–8)
PLATELET # BLD AUTO: 192 K/UL (ref 150–450)
PMV BLD AUTO: 10.2 FL (ref 9.2–12.9)
POTASSIUM SERPL-SCNC: 3.6 MMOL/L (ref 3.5–5.1)
PROT SERPL-MCNC: 7.7 G/DL (ref 6–8.4)
PROT UR QL STRIP: NEGATIVE
RBC # BLD AUTO: 5.29 M/UL (ref 4.6–6.2)
RBC #/AREA URNS AUTO: 0 /HPF (ref 0–4)
SODIUM SERPL-SCNC: 138 MMOL/L (ref 136–145)
SP GR UR STRIP: 1.02 (ref 1–1.03)
URN SPEC COLLECT METH UR: ABNORMAL
WBC # BLD AUTO: 8.74 K/UL (ref 3.9–12.7)
WBC #/AREA URNS AUTO: 1 /HPF (ref 0–5)

## 2022-08-05 PROCEDURE — 81001 URINALYSIS AUTO W/SCOPE: CPT | Performed by: INTERNAL MEDICINE

## 2022-08-05 PROCEDURE — 99999 PR PBB SHADOW E&M-EST. PATIENT-LVL III: ICD-10-PCS | Mod: PBBFAC,,, | Performed by: INTERNAL MEDICINE

## 2022-08-05 PROCEDURE — 1160F PR REVIEW ALL MEDS BY PRESCRIBER/CLIN PHARMACIST DOCUMENTED: ICD-10-PCS | Mod: CPTII,S$GLB,, | Performed by: INTERNAL MEDICINE

## 2022-08-05 PROCEDURE — 87536 HIV-1 QUANT&REVRSE TRNSCRPJ: CPT | Performed by: INTERNAL MEDICINE

## 2022-08-05 PROCEDURE — 1159F MED LIST DOCD IN RCRD: CPT | Mod: CPTII,S$GLB,, | Performed by: INTERNAL MEDICINE

## 2022-08-05 PROCEDURE — 3079F DIAST BP 80-89 MM HG: CPT | Mod: CPTII,S$GLB,, | Performed by: INTERNAL MEDICINE

## 2022-08-05 PROCEDURE — 87491 CHLMYD TRACH DNA AMP PROBE: CPT | Performed by: INTERNAL MEDICINE

## 2022-08-05 PROCEDURE — 99999 PR PBB SHADOW E&M-EST. PATIENT-LVL III: CPT | Mod: PBBFAC,,, | Performed by: INTERNAL MEDICINE

## 2022-08-05 PROCEDURE — 3008F PR BODY MASS INDEX (BMI) DOCUMENTED: ICD-10-PCS | Mod: CPTII,S$GLB,, | Performed by: INTERNAL MEDICINE

## 2022-08-05 PROCEDURE — 3008F BODY MASS INDEX DOCD: CPT | Mod: CPTII,S$GLB,, | Performed by: INTERNAL MEDICINE

## 2022-08-05 PROCEDURE — 96372 THER/PROPH/DIAG INJ SC/IM: CPT | Mod: S$GLB,,, | Performed by: INTERNAL MEDICINE

## 2022-08-05 PROCEDURE — 3075F PR MOST RECENT SYSTOLIC BLOOD PRESS GE 130-139MM HG: ICD-10-PCS | Mod: CPTII,S$GLB,, | Performed by: INTERNAL MEDICINE

## 2022-08-05 PROCEDURE — 3075F SYST BP GE 130 - 139MM HG: CPT | Mod: CPTII,S$GLB,, | Performed by: INTERNAL MEDICINE

## 2022-08-05 PROCEDURE — 87591 N.GONORRHOEAE DNA AMP PROB: CPT | Performed by: INTERNAL MEDICINE

## 2022-08-05 PROCEDURE — 1160F RVW MEDS BY RX/DR IN RCRD: CPT | Mod: CPTII,S$GLB,, | Performed by: INTERNAL MEDICINE

## 2022-08-05 PROCEDURE — 3079F PR MOST RECENT DIASTOLIC BLOOD PRESSURE 80-89 MM HG: ICD-10-PCS | Mod: CPTII,S$GLB,, | Performed by: INTERNAL MEDICINE

## 2022-08-05 PROCEDURE — 99214 PR OFFICE/OUTPT VISIT, EST, LEVL IV, 30-39 MIN: ICD-10-PCS | Mod: 25,S$GLB,, | Performed by: INTERNAL MEDICINE

## 2022-08-05 PROCEDURE — 96372 PR INJECTION,THERAP/PROPH/DIAG2ST, IM OR SUBCUT: ICD-10-PCS | Mod: S$GLB,,, | Performed by: INTERNAL MEDICINE

## 2022-08-05 PROCEDURE — 80053 COMPREHEN METABOLIC PANEL: CPT | Performed by: INTERNAL MEDICINE

## 2022-08-05 PROCEDURE — 86361 T CELL ABSOLUTE COUNT: CPT | Performed by: INTERNAL MEDICINE

## 2022-08-05 PROCEDURE — 99214 OFFICE O/P EST MOD 30 MIN: CPT | Mod: 25,S$GLB,, | Performed by: INTERNAL MEDICINE

## 2022-08-05 PROCEDURE — 1159F PR MEDICATION LIST DOCUMENTED IN MEDICAL RECORD: ICD-10-PCS | Mod: CPTII,S$GLB,, | Performed by: INTERNAL MEDICINE

## 2022-08-05 PROCEDURE — 36415 COLL VENOUS BLD VENIPUNCTURE: CPT | Performed by: INTERNAL MEDICINE

## 2022-08-05 PROCEDURE — 85025 COMPLETE CBC W/AUTO DIFF WBC: CPT | Performed by: INTERNAL MEDICINE

## 2022-08-05 NOTE — TELEPHONE ENCOUNTER
----- Message from Hue Rubio sent at 8/5/2022 11:58 AM CDT -----  Contact: self @ [phone]  Pt says he is returning Sarahi's call concerning an earlier appt.  Pt says he will be able to come in today at 2:30.

## 2022-08-05 NOTE — PROGRESS NOTES
Patient received Bicillin 2.4 million units in the left buttocks. Pt tolerated well. Pt asked to wait in the clinic 15 minutes after injection in the event of an allergic reaction. Pt verbalized understanding. Pt left in NAD.

## 2022-08-05 NOTE — PROGRESS NOTES
Subjective:      Patient ID: Lenard Marrufo is a 42 y.o. male.    Chief Complaint:HIV Positive/AIDS      History of Present Illness    42 year old male with a history of HIV diagnosed in 2017 or 2018.  He was previously on HIV therapy.  He moved to [place] in 2019.  He was off of therapy until may of 2022.      Medical History  HIV diagnosed in 2009, atripla then complera then triumeq.    Social History   to  in October 2019.  Originally from [place].  Smokes 2-3 black&milds.  No marijuana.  ETOH 2-3 times a week.  Never any illicits.  Owns 2 dogs.        Review of Systems   All other systems reviewed and are negative.    Objective:   Physical Exam  Vitals and nursing note reviewed.   Constitutional:       General: He is not in acute distress.     Appearance: He is well-developed. He is not diaphoretic.   HENT:      Head: Normocephalic and atraumatic.      Right Ear: External ear normal.      Left Ear: External ear normal.      Nose: Nose normal.      Mouth/Throat:      Pharynx: No oropharyngeal exudate.   Eyes:      General: No scleral icterus.        Right eye: No discharge.         Left eye: No discharge.      Conjunctiva/sclera: Conjunctivae normal.      Pupils: Pupils are equal, round, and reactive to light.   Neck:      Thyroid: No thyromegaly.      Vascular: No JVD.      Trachea: No tracheal deviation.   Cardiovascular:      Rate and Rhythm: Normal rate and regular rhythm.      Heart sounds: No murmur heard.    No friction rub. No gallop.   Pulmonary:      Effort: Pulmonary effort is normal. No respiratory distress.      Breath sounds: Normal breath sounds. No stridor. No wheezing or rales.   Chest:      Chest wall: No tenderness.   Abdominal:      General: Bowel sounds are normal. There is no distension.      Palpations: Abdomen is soft. There is no mass.      Tenderness: There is no abdominal tenderness. There is no guarding or rebound.   Musculoskeletal:         General: No  tenderness. Normal range of motion.      Cervical back: Normal range of motion and neck supple.   Lymphadenopathy:      Cervical: No cervical adenopathy.   Skin:     General: Skin is warm.      Coloration: Skin is not pale.      Findings: No erythema or rash.   Neurological:      Mental Status: He is alert and oriented to person, place, and time.      Cranial Nerves: No cranial nerve deficit.      Motor: No abnormal muscle tone.      Coordination: Coordination normal.      Deep Tendon Reflexes: Reflexes are normal and symmetric. Reflexes normal.   Psychiatric:         Behavior: Behavior normal.         Thought Content: Thought content normal.         Judgment: Judgment normal.       Assessment:       1. HIV positive :  Will check labs today.  Patient is to continue on triumeq.   2. Late latent syphilis :  Will treat with benzathine penicillin.         Plan:       HIV positive  -     CD4 T-[place] Cells; Future; Expected date: 08/05/2022  -     Comprehensive Metabolic Panel; Future; Expected date: 08/05/2022  -     HIV RNA, Quantitative, PCR; Future; Expected date: 08/05/2022  -     CBC Auto Differential; Future; Expected date: 08/05/2022  -     Urinalysis  -     C. trachomatis/N. gonorrhoeae by AMP DNA  -     abacavir-dolutegravir-lamivud (TRIUMEQ) 600- mg Tab; Take 1 tablet by mouth once daily.  Dispense: 30 tablet; Refill: 5    Late latent syphilis  -     penicillin G benzathine (BICILLIN LA) injection 2.4 Million Units    Other orders  -     Urinalysis Microscopic

## 2022-08-06 LAB
C TRACH DNA SPEC QL NAA+PROBE: NOT DETECTED
N GONORRHOEA DNA SPEC QL NAA+PROBE: NOT DETECTED

## 2022-08-08 LAB
CD3+CD4+ CELLS # BLD: 658 CELLS/UL (ref 300–1400)
CD3+CD4+ CELLS NFR BLD: 30.6 % (ref 28–57)
HIV1 RNA # PLAS NAA DL=20: NORMAL COPIES/ML

## 2022-08-09 ENCOUNTER — IMMUNIZATION (OUTPATIENT)
Dept: INTERNAL MEDICINE | Facility: CLINIC | Age: 42
End: 2022-08-09
Payer: COMMERCIAL

## 2022-08-09 PROCEDURE — 90611 SMALLPOX&MONKEYPOX VAC 0.5ML: CPT | Mod: S$GLB,,, | Performed by: INTERNAL MEDICINE

## 2022-08-09 PROCEDURE — 90471 IMMUNIZATION ADMIN: CPT | Mod: S$GLB,,, | Performed by: INTERNAL MEDICINE

## 2022-08-09 PROCEDURE — 90471 PR IMMUNIZ ADMIN,1 SINGLE/COMB VAC/TOXOID: ICD-10-PCS | Mod: S$GLB,,, | Performed by: INTERNAL MEDICINE

## 2022-08-09 PROCEDURE — 90611 SMALLPOX MONKEYPOX VACCINE: ICD-10-PCS | Mod: S$GLB,,, | Performed by: INTERNAL MEDICINE

## 2022-08-12 ENCOUNTER — CLINICAL SUPPORT (OUTPATIENT)
Dept: INFECTIOUS DISEASES | Facility: CLINIC | Age: 42
End: 2022-08-12
Payer: COMMERCIAL

## 2022-08-12 PROCEDURE — 96372 THER/PROPH/DIAG INJ SC/IM: CPT | Mod: S$GLB,,, | Performed by: INTERNAL MEDICINE

## 2022-08-12 PROCEDURE — 99999 PR PBB SHADOW E&M-EST. PATIENT-LVL I: CPT | Mod: PBBFAC,,,

## 2022-08-12 PROCEDURE — 96372 PR INJECTION,THERAP/PROPH/DIAG2ST, IM OR SUBCUT: ICD-10-PCS | Mod: S$GLB,,, | Performed by: INTERNAL MEDICINE

## 2022-08-12 PROCEDURE — 99999 PR PBB SHADOW E&M-EST. PATIENT-LVL I: ICD-10-PCS | Mod: PBBFAC,,,

## 2022-08-12 NOTE — PROGRESS NOTES
Patient received 2nd Bicillin 2.4 million units IM to the Right buttocks.  Tolerated well and left in NAD

## 2022-08-18 ENCOUNTER — CLINICAL SUPPORT (OUTPATIENT)
Dept: INFECTIOUS DISEASES | Facility: CLINIC | Age: 42
End: 2022-08-18
Payer: COMMERCIAL

## 2022-08-18 PROCEDURE — 96372 PR INJECTION,THERAP/PROPH/DIAG2ST, IM OR SUBCUT: ICD-10-PCS | Mod: S$GLB,,, | Performed by: INTERNAL MEDICINE

## 2022-08-18 PROCEDURE — 96372 THER/PROPH/DIAG INJ SC/IM: CPT | Mod: S$GLB,,, | Performed by: INTERNAL MEDICINE

## 2022-08-18 NOTE — PROGRESS NOTES
Patient received Bicillin 1.2 million units in bilateral buttocks (2.4 millon units total.)  Pt tolerated well. Pt asked to wait in the clinic 15 minutes after injection in the event of an allergic reaction. Pt verbalized understanding. Pt left in NAD.

## 2022-08-21 ENCOUNTER — PATIENT MESSAGE (OUTPATIENT)
Dept: INFECTIOUS DISEASES | Facility: CLINIC | Age: 42
End: 2022-08-21
Payer: COMMERCIAL

## 2022-09-06 ENCOUNTER — IMMUNIZATION (OUTPATIENT)
Dept: INTERNAL MEDICINE | Facility: CLINIC | Age: 42
End: 2022-09-06
Payer: COMMERCIAL

## 2022-09-06 PROCEDURE — 90611 SMALLPOX MONKEYPOX VACCINE: ICD-10-PCS | Mod: S$GLB,,, | Performed by: INTERNAL MEDICINE

## 2022-09-06 PROCEDURE — 90471 IMMUNIZATION ADMIN: CPT | Mod: S$GLB,,, | Performed by: INTERNAL MEDICINE

## 2022-09-06 PROCEDURE — 90611 SMALLPOX&MONKEYPOX VAC 0.5ML: CPT | Mod: S$GLB,,, | Performed by: INTERNAL MEDICINE

## 2022-09-06 PROCEDURE — 90471 PR IMMUNIZ ADMIN,1 SINGLE/COMB VAC/TOXOID: ICD-10-PCS | Mod: S$GLB,,, | Performed by: INTERNAL MEDICINE

## 2022-11-02 ENCOUNTER — TELEPHONE (OUTPATIENT)
Dept: INFECTIOUS DISEASES | Facility: CLINIC | Age: 42
End: 2022-11-02
Payer: COMMERCIAL

## 2022-11-02 ENCOUNTER — PATIENT MESSAGE (OUTPATIENT)
Dept: INFECTIOUS DISEASES | Facility: CLINIC | Age: 42
End: 2022-11-02
Payer: COMMERCIAL

## 2022-11-02 DIAGNOSIS — Z21 HIV POSITIVE: ICD-10-CM

## 2022-11-07 ENCOUNTER — TELEPHONE (OUTPATIENT)
Dept: INFECTIOUS DISEASES | Facility: CLINIC | Age: 42
End: 2022-11-07
Payer: COMMERCIAL

## 2022-11-07 ENCOUNTER — OFFICE VISIT (OUTPATIENT)
Dept: INFECTIOUS DISEASES | Facility: CLINIC | Age: 42
End: 2022-11-07

## 2022-11-07 DIAGNOSIS — B20 HUMAN IMMUNODEFICIENCY VIRUS (HIV) DISEASE: Primary | ICD-10-CM

## 2022-11-07 DIAGNOSIS — K52.9 CHRONIC DIARRHEA: ICD-10-CM

## 2022-11-07 PROCEDURE — 99214 PR OFFICE/OUTPT VISIT, EST, LEVL IV, 30-39 MIN: ICD-10-PCS | Mod: 95,,, | Performed by: INTERNAL MEDICINE

## 2022-11-07 PROCEDURE — 99214 OFFICE O/P EST MOD 30 MIN: CPT | Mod: 95,,, | Performed by: INTERNAL MEDICINE

## 2022-11-07 NOTE — PROGRESS NOTES
Subjective:      Patient ID: Lenard Marrufo is a 42 y.o. male.    Chief Complaint:No chief complaint on file.    The patient location is: home  The chief complaint leading to consultation is: follow up    Visit type: audiovisual    Face to Face time with patient: 20 minutes of total time spent on the encounter, which includes face to face time and non-face to face time preparing to see the patient (eg, review of tests), Obtaining and/or reviewing separately obtained history, Documenting clinical information in the electronic or other health record, Independently interpreting results (not separately reported) and communicating results to the patient/family/caregiver, or Care coordination (not separately reported).         Each patient to whom he or she provides medical services by telemedicine is:  (1) informed of the relationship between the physician and patient and the respective role of any other health care provider with respect to management of the patient; and (2) notified that he or she may decline to receive medical services by telemedicine and may withdraw from such care at any time.    Notes:       History of Present Illness    42 year old male with a history of HIV diagnosed in 2017 or 2018.  He was previously on HIV therapy.  He moved to [place] in 2019.  He was off of therapy until may of 2022.      Interval History  Having diarrhea.  Feels it started after cholecystectomy.  Currently getting repeat MMR as measles titers were negative as [place].      Interval History  Gained 5-10 lbs    Medical History  HIV diagnosed in 2009, atripla then complera then triumeq.  Cholecystectomy    Family History  Diabetes mellitus on father's side    Social History   to  in October 2019.  Originally from [place].  Smokes 2-3 black&milds.  No marijuana.  ETOH 2-3 times a week.  Never any illicits.  Owns 2 dogs.        Review of Systems   All other systems reviewed and are  negative.  Objective:   Physical Exam  Assessment:       1. HIV positive :  Will check labs today.  Will check genosure as he is interested in starting cabenuva.  Patient is to continue on triumeq.    2. Chronic diarrhea:  Will check stool studies         Plan:       Human immunodeficiency virus (HIV) disease  -     CD4 T-[place] Cells; Future; Expected date: 11/07/2022  -     Comprehensive Metabolic Panel; Future; Expected date: 11/07/2022  -     HIV RNA, Quantitative, PCR; Future; Expected date: 11/07/2022  -     CBC Auto Differential; Future; Expected date: 11/07/2022  -     Hemoglobin A1C; Future; Expected date: 11/07/2022  -     (In Office Administered) Pneumococcal Conjugate Vaccine (20 Valent) (IM); Future; Expected date: 11/07/2022  -     Tdap Vaccine; Future; Expected date: 11/07/2022  -     RPR; Future; Expected date: 11/07/2022  -     GENOSURE ARCHIVE DNA SEQUENCING; Future; Expected date: 11/07/2022    Chronic diarrhea  -     Clostridium difficile EIA; Future; Expected date: 11/07/2022  -     Giardia / Cryptosporidum, EIA; Future; Expected date: 11/07/2022  -     Stool Exam-Ova,Cysts,Parasites; Future; Expected date: 11/07/2022         He will get influenza vaccin.

## 2022-11-07 NOTE — TELEPHONE ENCOUNTER
----- Message from Jaky Leal sent at 11/7/2022  2:03 PM CST -----  Regarding: Appointment  Contact: [phone]  Pt is wanting to know if there is anyway he can come a little later for his appointment @ 2:30. He is coming from the [facility] and wont make it back until about 3. Please call to discuss further @ [phone]

## 2023-04-28 ENCOUNTER — TELEPHONE (OUTPATIENT)
Dept: INFECTIOUS DISEASES | Facility: CLINIC | Age: 43
End: 2023-04-28
Payer: COMMERCIAL

## 2023-04-28 DIAGNOSIS — Z21 HIV POSITIVE: ICD-10-CM

## 2023-04-28 NOTE — TELEPHONE ENCOUNTER
----- Message from Jatin Huff sent at 4/28/2023 11:01 AM CDT -----  Regarding: New prescription  Contact: Cherrie/ [address] [phone]  Calling in regards to getting new prescription for rx abacavir-dolutegravir-lamivud (TRIUMEQ) 600- mg Tab. Please call to confirm

## 2023-08-04 ENCOUNTER — TELEPHONE (OUTPATIENT)
Dept: INFECTIOUS DISEASES | Facility: CLINIC | Age: 43
End: 2023-08-04

## 2023-08-08 ENCOUNTER — TELEPHONE (OUTPATIENT)
Dept: INFECTIOUS DISEASES | Facility: CLINIC | Age: 43
End: 2023-08-08

## 2023-08-08 DIAGNOSIS — Z21 HIV POSITIVE: Primary | ICD-10-CM

## 2023-08-08 RX ORDER — ABACAVIR SULFATE, DOLUTEGRAVIR SODIUM, LAMIVUDINE 600; 50; 300 MG/1; MG/1; MG/1
1 TABLET, FILM COATED ORAL DAILY
Qty: 30 TABLET | Refills: 5 | Status: SHIPPED | OUTPATIENT
Start: 2023-08-08 | End: 2024-01-30 | Stop reason: SDUPTHER

## 2023-08-08 NOTE — TELEPHONE ENCOUNTER
----- Message from Bridgett Hughes sent at 8/8/2023  8:39 AM CDT -----  Regarding: URGENT REFILL REQUEST  Contact: [phone]  Hi, pharmacy called to request an urgent refill request for the pt. Pt is out of medication and needs the refill today. abacavir-dolutegravir-lamivud (TRIUMEQ) 600- mg Tab    Pls send the refill to :    [facility]. - [address] [address]  Phone: [phone] Fax: [phone]       The nurse called to say she faxed over the request multiple times ans says pt can't go without this medication.

## 2024-01-30 ENCOUNTER — TELEPHONE (OUTPATIENT)
Dept: INFECTIOUS DISEASES | Facility: CLINIC | Age: 44
End: 2024-01-30

## 2024-01-30 DIAGNOSIS — B20 HUMAN IMMUNODEFICIENCY VIRUS (HIV) DISEASE: Primary | ICD-10-CM

## 2024-01-30 DIAGNOSIS — Z12.5 ENCOUNTER FOR PROSTATE CANCER SCREENING: ICD-10-CM

## 2024-01-30 DIAGNOSIS — Z21 HIV POSITIVE: ICD-10-CM

## 2024-01-30 RX ORDER — ABACAVIR SULFATE, DOLUTEGRAVIR SODIUM, LAMIVUDINE 600; 50; 300 MG/1; MG/1; MG/1
1 TABLET, FILM COATED ORAL DAILY
Qty: 30 TABLET | Refills: 2 | Status: SHIPPED | OUTPATIENT
Start: 2024-01-30 | End: 2024-04-25 | Stop reason: SDUPTHER

## 2024-01-30 NOTE — TELEPHONE ENCOUNTER
----- Message from Gina Leonard sent at 1/30/2024 12:57 PM CST -----  Lenard Marrufo calling regarding Refills  (message) Pharmacy calling to f/u on the Rx request that was sent in for  abacavir-dolutegravir-lamivud (TRIUMEQ) 600- mg Tab asking for a call back Pt is due in two days for meds [phone]

## 2024-01-31 ENCOUNTER — PATIENT MESSAGE (OUTPATIENT)
Dept: INFECTIOUS DISEASES | Facility: CLINIC | Age: 44
End: 2024-01-31

## 2024-01-31 NOTE — TELEPHONE ENCOUNTER
Patient hasn't been seen or had labs done in over a year.    Plan  Schedule labs and follow up.  Prescription sent for 3 months.

## 2024-04-25 DIAGNOSIS — Z21 HIV POSITIVE: ICD-10-CM

## 2024-04-25 RX ORDER — ABACAVIR SULFATE, DOLUTEGRAVIR SODIUM, LAMIVUDINE 600; 50; 300 MG/1; MG/1; MG/1
1 TABLET, FILM COATED ORAL DAILY
Qty: 30 TABLET | Refills: 0 | Status: SHIPPED | OUTPATIENT
Start: 2024-04-25 | End: 2024-05-25

## 2024-04-25 NOTE — TELEPHONE ENCOUNTER
Patient hasn't been seen in clinic since 2022.  Please confirm with patient if he has a new infectious diseases doctor.  If so, they should send his prescription in.  I am going to send in only 1 month of therapy so there isn't any disruption in his treatment.  If I am still his doctor, then he will need to do labs and been seen in clinic.  Please notify the patient.

## (undated) DEVICE — TROCAR SPACEMAKER BLUNT 10MM

## (undated) DEVICE — ELECTRODE REM PLYHSV RETURN 9

## (undated) DEVICE — DRAPE ABDOMINAL TIBURON 14X11

## (undated) DEVICE — APPLIER CLIP ENDO LIGAMAX 5MM

## (undated) DEVICE — BAG TISS RETRV MONARCH 10MM

## (undated) DEVICE — SUT VICRYL PLUS 4-0 P3 18IN

## (undated) DEVICE — DRAPE CORETEMP FLD WRM 56X62IN

## (undated) DEVICE — DRAPE STERI INSTRUMENT 1018

## (undated) DEVICE — IRRIGATOR ENDOSCOPY DISP.

## (undated) DEVICE — ADHESIVE DERMABOND ADVANCED

## (undated) DEVICE — NDL HYPO REG 25G X 1 1/2

## (undated) DEVICE — TOWEL OR DISP STRL BLUE 4/PK

## (undated) DEVICE — TROCAR ENDOPATH XCEL 5MM 7.5CM

## (undated) DEVICE — TUBING HF INSUFFLATION W/ FLTR

## (undated) DEVICE — SOL NS 1000CC

## (undated) DEVICE — SCISSOR 5MMX35CM DIRECT DRIVE

## (undated) DEVICE — TRAY MINOR GEN SURG

## (undated) DEVICE — KIT ANTIFOG W/SPONG & FLUID

## (undated) DEVICE — SUT 0 VICRYL / UR6 (J603)

## (undated) DEVICE — BLADE SURG CARBON STEEL SZ11